# Patient Record
Sex: MALE | Race: WHITE | Employment: OTHER | URBAN - METROPOLITAN AREA
[De-identification: names, ages, dates, MRNs, and addresses within clinical notes are randomized per-mention and may not be internally consistent; named-entity substitution may affect disease eponyms.]

---

## 2017-05-01 PROCEDURE — 87086 URINE CULTURE/COLONY COUNT: CPT | Performed by: EMERGENCY MEDICINE

## 2017-08-24 PROCEDURE — 36415 COLL VENOUS BLD VENIPUNCTURE: CPT | Performed by: INTERNAL MEDICINE

## 2017-08-24 PROCEDURE — 84154 ASSAY OF PSA FREE: CPT | Performed by: UROLOGY

## 2017-08-24 PROCEDURE — 82652 VIT D 1 25-DIHYDROXY: CPT | Performed by: INTERNAL MEDICINE

## 2017-08-24 PROCEDURE — 84153 ASSAY OF PSA TOTAL: CPT | Performed by: UROLOGY

## 2017-10-02 PROBLEM — Z79.01 ANTICOAGULANT LONG-TERM USE: Status: ACTIVE | Noted: 2017-10-02

## 2017-12-18 PROCEDURE — 87086 URINE CULTURE/COLONY COUNT: CPT | Performed by: EMERGENCY MEDICINE

## 2017-12-20 ENCOUNTER — HOSPITAL ENCOUNTER (INPATIENT)
Facility: HOSPITAL | Age: 82
LOS: 2 days | Discharge: HOME | DRG: 390 | End: 2017-12-22
Attending: HOSPITALIST | Admitting: HOSPITALIST
Payer: MEDICARE

## 2017-12-20 DIAGNOSIS — I48.20 CHRONIC ATRIAL FIBRILLATION (HCC): Primary | ICD-10-CM

## 2017-12-20 PROBLEM — K56.609 SMALL BOWEL OBSTRUCTION (HCC): Status: ACTIVE | Noted: 2017-12-20

## 2017-12-20 PROCEDURE — 87086 URINE CULTURE/COLONY COUNT: CPT | Performed by: EMERGENCY MEDICINE

## 2017-12-20 RX ORDER — DEXTROSE, SODIUM CHLORIDE, AND POTASSIUM CHLORIDE 5; .45; .15 G/100ML; G/100ML; G/100ML
INJECTION INTRAVENOUS CONTINUOUS
Status: DISCONTINUED | OUTPATIENT
Start: 2017-12-21 | End: 2017-12-22

## 2017-12-20 RX ORDER — MORPHINE SULFATE 2 MG/ML
1 INJECTION, SOLUTION INTRAMUSCULAR; INTRAVENOUS
Status: DISCONTINUED | OUTPATIENT
Start: 2017-12-20 | End: 2017-12-22

## 2017-12-20 RX ORDER — ONDANSETRON 2 MG/ML
8 INJECTION INTRAMUSCULAR; INTRAVENOUS EVERY 6 HOURS PRN
Status: DISCONTINUED | OUTPATIENT
Start: 2017-12-20 | End: 2017-12-22

## 2017-12-21 ENCOUNTER — APPOINTMENT (OUTPATIENT)
Dept: GENERAL RADIOLOGY | Facility: HOSPITAL | Age: 82
DRG: 390 | End: 2017-12-21
Attending: SURGERY
Payer: MEDICARE

## 2017-12-21 PROCEDURE — 85610 PROTHROMBIN TIME: CPT | Performed by: SURGERY

## 2017-12-21 PROCEDURE — 85730 THROMBOPLASTIN TIME PARTIAL: CPT | Performed by: SURGERY

## 2017-12-21 PROCEDURE — 85025 COMPLETE CBC W/AUTO DIFF WBC: CPT | Performed by: SURGERY

## 2017-12-21 PROCEDURE — 83735 ASSAY OF MAGNESIUM: CPT | Performed by: SURGERY

## 2017-12-21 PROCEDURE — 80048 BASIC METABOLIC PNL TOTAL CA: CPT | Performed by: SURGERY

## 2017-12-21 PROCEDURE — 84100 ASSAY OF PHOSPHORUS: CPT | Performed by: SURGERY

## 2017-12-21 PROCEDURE — 74020 XR ABDOMEN, OBSTRUCTIVE SERIES (CPT=74020): CPT | Performed by: SURGERY

## 2017-12-21 RX ORDER — LEVOTHYROXINE SODIUM 0.07 MG/1
75 TABLET ORAL
Status: DISCONTINUED | OUTPATIENT
Start: 2017-12-22 | End: 2017-12-22

## 2017-12-21 RX ORDER — PANTOPRAZOLE SODIUM 40 MG/1
40 TABLET, DELAYED RELEASE ORAL
Status: DISCONTINUED | OUTPATIENT
Start: 2017-12-22 | End: 2017-12-22

## 2017-12-21 RX ORDER — 0.9 % SODIUM CHLORIDE 0.9 %
VIAL (ML) INJECTION
Status: COMPLETED
Start: 2017-12-21 | End: 2017-12-21

## 2017-12-21 RX ORDER — DONEPEZIL HYDROCHLORIDE 10 MG/1
10 TABLET, FILM COATED ORAL NIGHTLY
Status: DISCONTINUED | OUTPATIENT
Start: 2017-12-21 | End: 2017-12-22

## 2017-12-21 RX ORDER — ALFUZOSIN HYDROCHLORIDE 10 MG/1
10 TABLET, EXTENDED RELEASE ORAL
Status: DISCONTINUED | OUTPATIENT
Start: 2017-12-22 | End: 2017-12-22

## 2017-12-21 RX ORDER — MEMANTINE HYDROCHLORIDE 5 MG/1
10 TABLET ORAL 2 TIMES DAILY
Status: DISCONTINUED | OUTPATIENT
Start: 2017-12-21 | End: 2017-12-22

## 2017-12-21 NOTE — CM/SW NOTE
Explanation of of BPCI/Medicare program provided. Patient was enrolled under . Patient/family agreed to phone f/u for 3 months from [facility] after discharge from [address] [place] [address]. BPCI/Medicare letter and brochure provided.     [place], [address] [place] [address]

## 2017-12-21 NOTE — PLAN OF CARE
Altered Communication/Language Barrier    • Patient/Family is able to understand and participate in their care Progressing        GASTROINTESTINAL - ADULT    • Minimal or absence of nausea and vomiting Progressing    • Maintains or returns to baseline lauren

## 2017-12-22 ENCOUNTER — APPOINTMENT (OUTPATIENT)
Dept: GENERAL RADIOLOGY | Facility: HOSPITAL | Age: 82
DRG: 390 | End: 2017-12-22
Attending: SURGERY
Payer: MEDICARE

## 2017-12-22 VITALS
SYSTOLIC BLOOD PRESSURE: 134 MMHG | TEMPERATURE: 99 F | RESPIRATION RATE: 20 BRPM | DIASTOLIC BLOOD PRESSURE: 75 MMHG | HEART RATE: 62 BPM | OXYGEN SATURATION: 96 %

## 2017-12-22 PROCEDURE — 92610 EVALUATE SWALLOWING FUNCTION: CPT

## 2017-12-22 PROCEDURE — 84100 ASSAY OF PHOSPHORUS: CPT | Performed by: SURGERY

## 2017-12-22 PROCEDURE — 85610 PROTHROMBIN TIME: CPT | Performed by: SURGERY

## 2017-12-22 PROCEDURE — 74020 XR ABDOMEN, OBSTRUCTIVE SERIES (CPT=74020): CPT | Performed by: SURGERY

## 2017-12-22 PROCEDURE — 83735 ASSAY OF MAGNESIUM: CPT | Performed by: SURGERY

## 2017-12-22 PROCEDURE — 85025 COMPLETE CBC W/AUTO DIFF WBC: CPT | Performed by: SURGERY

## 2017-12-22 PROCEDURE — 85730 THROMBOPLASTIN TIME PARTIAL: CPT | Performed by: SURGERY

## 2017-12-22 PROCEDURE — 80048 BASIC METABOLIC PNL TOTAL CA: CPT | Performed by: SURGERY

## 2017-12-22 NOTE — PLAN OF CARE
Altered Communication/Language Barrier    • Patient/Family is able to understand and participate in their care Progressing    Patient speaks [place] and [place]. Speaks [place] when he is more confused per family.     GASTROINTESTINAL - ADULT    • Minimal or ab

## 2017-12-22 NOTE — SLP NOTE
ADULT SWALLOWING EVALUATION    ASSESSMENT    ASSESSMENT/OVERALL IMPRESSION:  SLP swallow orders received and acknowledged.  Pt upright 90 degrees in chair at bedside for swallow eval. Pt's daughter present at bedside and reports pt coughing post meals while Regular  Precautions: Aspiration    Patient/Family Goals: Use of Compensatory Strategies     SWALLOWING HISTORY  Current Diet Consistency:  (Clear Liquids)  Dysphagia History: N/A   Imaging Results: N/A     OBJECTIVE   ORAL MOTOR EXAMINATION  Dentition:  Fu Zeenat King    Therapist:  Zeenat King M.S. [address] [facility]  Speech Language Pathologist

## 2017-12-22 NOTE — H&P
[facility] Hospitalist H&P       CC: abdominal pain     PCP: Justice Luna MD    ASSESSMENT / PLAN:   Patient is a 80year old male with PMH sig for HTN, CAD, SSS s/p pacemaker, afib on coumadin, dementia, prostate ca s/p brachytherapy 2006, hypothyroidism, B hospital. Per son pt has had sbo in past but has had less issues recently since taking miralax daily.        PMH  Past Medical History:   Diagnosis Date   • Coronary atherosclerosis of unspecified type of vessel, native or graft 2001    has pacemaker   • Humberto Dozier capsule Rfl: 3   aspirin EC 81 MG Oral Tab EC Take 81 mg by mouth. Disp:  Rfl:    metoprolol Tartrate 25 MG Oral Tab Take 12.5 mg by mouth 2 (two) times daily.    Disp:  Rfl:    DONEPEZIL HCL 10 MG Oral Tab TAKE ONE TABLET BY MOUTH NIGHTLY Disp: 90 tablet R tenderness mid abdominal region, no gaurding. No masses,  No organomegaly. Non distended   Extremities: Extremities normal, atraumatic, no cyanosis or edema. Skin: Skin color and texture are normal.  No rashes or lesions.     Neurologic: Normal strength, vault.  3. Lesser incidental findings as above. Ct Abdomen+pelvis(contrast Only)(cpt=74177)    Result Date: 12/20/2017  IMPRESSION: 1.  Small bowel obstruction new from the previous exam with a transition point in the left lower quadrant, probably re

## 2017-12-22 NOTE — PROGRESS NOTES
[facility]    Progress Note    Shayy Flynn Patient Status:  Inpatient    8/10/1932 MRN [id number]   Location [facility] 4W/SW/SE Attending Osmar Xavier [facility] Day # 2 PCP Clemetine Hamman, MD       Subjective: 119*  129*  104*   BUN  17  13  7*   CREATSERUM  1.09  0.91  0.87   GFRAA   --   >60  >60   GFRNAA   --   >60  >60   CA   --   8.4*  8.3*   ALB  3.4*   --    --    NA  134*  136  140   K  3.9  3.9  3.8   CL  104  104  110   CO2  26.2  26  26   ALKPHO  57 care:    02350- 25 min      Dhru[address] 30  12/22/2017  8:47 AM

## 2017-12-23 NOTE — DISCHARGE SUMMARY
[facility] Hospitalist Discharge Summary   Patient ID:  Flora Gil  [id number]  9 year old  8/10/1932    Admit date: 12/20/2017  Discharge date: 12/22/2017  Primary Care Physician: Stephanie Bolaños MD   Attending Physician: No att. providers found   Co CTAB/L  CARDIO: irregular, no murmur  ABD: soft, NT, ND, +BS  : no juarez  EXTREMITIES: no edema, no calf tenderness    Operative Procedures:   Radiology:   Xr Abdomen, Obstructive Series (cpt=74020)    Result Date: 12/22/2017  CONCLUSION:  1. Stable exam units Caps  Commonly known as:  DRISDOL/VITAMIN D2  Take 1 capsule (50,000 Units total) by mouth once a week.      ICAPS AREDS FORMULA OR     Levothyroxine Sodium 75 MCG Tabs  Commonly known as:  SYNTHROID, LEVOTHROID  Take 1 tablet (75 mcg total) by mouth

## 2018-02-20 PROCEDURE — 84153 ASSAY OF PSA TOTAL: CPT | Performed by: UROLOGY

## 2018-02-20 PROCEDURE — 84154 ASSAY OF PSA FREE: CPT | Performed by: UROLOGY

## 2018-02-20 PROCEDURE — 86480 TB TEST CELL IMMUN MEASURE: CPT | Performed by: INTERNAL MEDICINE

## 2018-05-01 PROBLEM — Z51.81 MONITORING FOR LONG-TERM ANTICOAGULANT USE: Status: ACTIVE | Noted: 2018-05-01

## 2018-05-01 PROBLEM — Z79.01 MONITORING FOR LONG-TERM ANTICOAGULANT USE: Status: ACTIVE | Noted: 2018-05-01

## 2018-06-21 PROBLEM — Z79.01 MONITORING FOR LONG-TERM ANTICOAGULANT USE: Status: RESOLVED | Noted: 2018-05-01 | Resolved: 2018-06-21

## 2018-06-21 PROBLEM — Z79.01 ANTICOAGULANT LONG-TERM USE: Status: RESOLVED | Noted: 2017-10-02 | Resolved: 2018-06-21

## 2018-06-21 PROBLEM — Z51.81 MONITORING FOR LONG-TERM ANTICOAGULANT USE: Status: RESOLVED | Noted: 2018-05-01 | Resolved: 2018-06-21

## 2018-06-21 PROCEDURE — 84154 ASSAY OF PSA FREE: CPT | Performed by: UROLOGY

## 2018-06-21 PROCEDURE — 84153 ASSAY OF PSA TOTAL: CPT | Performed by: UROLOGY

## 2018-08-20 PROCEDURE — 82607 VITAMIN B-12: CPT | Performed by: OTHER

## 2018-08-20 PROCEDURE — 82746 ASSAY OF FOLIC ACID SERUM: CPT | Performed by: OTHER

## 2018-11-11 ENCOUNTER — APPOINTMENT (OUTPATIENT)
Dept: CT IMAGING | Facility: HOSPITAL | Age: 83
DRG: 390 | End: 2018-11-11
Attending: EMERGENCY MEDICINE
Payer: MEDICARE

## 2018-11-11 ENCOUNTER — HOSPITAL ENCOUNTER (INPATIENT)
Facility: HOSPITAL | Age: 83
LOS: 3 days | Discharge: NURSING, REHAB OR LONG-TERM CARE | DRG: 390 | End: 2018-11-14
Attending: EMERGENCY MEDICINE | Admitting: HOSPITALIST
Payer: MEDICARE

## 2018-11-11 ENCOUNTER — APPOINTMENT (OUTPATIENT)
Dept: GENERAL RADIOLOGY | Facility: HOSPITAL | Age: 83
DRG: 390 | End: 2018-11-11
Attending: EMERGENCY MEDICINE
Payer: MEDICARE

## 2018-11-11 DIAGNOSIS — K56.609 SMALL BOWEL OBSTRUCTION (HCC): Primary | ICD-10-CM

## 2018-11-11 PROBLEM — R73.9 HYPERGLYCEMIA: Status: ACTIVE | Noted: 2018-11-11

## 2018-11-11 PROBLEM — R79.89 AZOTEMIA: Status: ACTIVE | Noted: 2018-11-11

## 2018-11-11 PROCEDURE — 85610 PROTHROMBIN TIME: CPT | Performed by: EMERGENCY MEDICINE

## 2018-11-11 PROCEDURE — 81001 URINALYSIS AUTO W/SCOPE: CPT | Performed by: EMERGENCY MEDICINE

## 2018-11-11 PROCEDURE — 70450 CT HEAD/BRAIN W/O DYE: CPT | Performed by: EMERGENCY MEDICINE

## 2018-11-11 PROCEDURE — 99285 EMERGENCY DEPT VISIT HI MDM: CPT

## 2018-11-11 PROCEDURE — 85025 COMPLETE CBC W/AUTO DIFF WBC: CPT | Performed by: EMERGENCY MEDICINE

## 2018-11-11 PROCEDURE — 85730 THROMBOPLASTIN TIME PARTIAL: CPT | Performed by: EMERGENCY MEDICINE

## 2018-11-11 PROCEDURE — 96361 HYDRATE IV INFUSION ADD-ON: CPT

## 2018-11-11 PROCEDURE — 80048 BASIC METABOLIC PNL TOTAL CA: CPT | Performed by: EMERGENCY MEDICINE

## 2018-11-11 PROCEDURE — 74019 RADEX ABDOMEN 2 VIEWS: CPT | Performed by: EMERGENCY MEDICINE

## 2018-11-11 PROCEDURE — 74177 CT ABD & PELVIS W/CONTRAST: CPT | Performed by: EMERGENCY MEDICINE

## 2018-11-11 PROCEDURE — 96360 HYDRATION IV INFUSION INIT: CPT

## 2018-11-11 RX ORDER — DEXTROSE, SODIUM CHLORIDE, AND POTASSIUM CHLORIDE 5; .45; .15 G/100ML; G/100ML; G/100ML
INJECTION INTRAVENOUS CONTINUOUS
Status: DISCONTINUED | OUTPATIENT
Start: 2018-11-11 | End: 2018-11-12

## 2018-11-11 RX ORDER — ONDANSETRON 2 MG/ML
4 INJECTION INTRAMUSCULAR; INTRAVENOUS EVERY 6 HOURS PRN
Status: DISCONTINUED | OUTPATIENT
Start: 2018-11-11 | End: 2018-11-11

## 2018-11-11 RX ORDER — ACETAMINOPHEN 325 MG/1
650 TABLET ORAL EVERY 6 HOURS PRN
Status: DISCONTINUED | OUTPATIENT
Start: 2018-11-11 | End: 2018-11-14

## 2018-11-11 RX ORDER — MORPHINE SULFATE 2 MG/ML
2 INJECTION, SOLUTION INTRAMUSCULAR; INTRAVENOUS EVERY 2 HOUR PRN
Status: DISCONTINUED | OUTPATIENT
Start: 2018-11-11 | End: 2018-11-14

## 2018-11-11 RX ORDER — MORPHINE SULFATE 4 MG/ML
4 INJECTION, SOLUTION INTRAMUSCULAR; INTRAVENOUS EVERY 2 HOUR PRN
Status: DISCONTINUED | OUTPATIENT
Start: 2018-11-11 | End: 2018-11-14

## 2018-11-11 RX ORDER — METOCLOPRAMIDE HYDROCHLORIDE 5 MG/ML
10 INJECTION INTRAMUSCULAR; INTRAVENOUS EVERY 8 HOURS PRN
Status: DISCONTINUED | OUTPATIENT
Start: 2018-11-11 | End: 2018-11-14

## 2018-11-11 RX ORDER — SODIUM CHLORIDE 0.9 % (FLUSH) 0.9 %
3 SYRINGE (ML) INJECTION AS NEEDED
Status: DISCONTINUED | OUTPATIENT
Start: 2018-11-11 | End: 2018-11-14

## 2018-11-11 RX ORDER — ENOXAPARIN SODIUM 100 MG/ML
40 INJECTION SUBCUTANEOUS NIGHTLY
Status: DISCONTINUED | OUTPATIENT
Start: 2018-11-11 | End: 2018-11-12

## 2018-11-11 RX ORDER — SODIUM CHLORIDE 9 MG/ML
INJECTION, SOLUTION INTRAVENOUS CONTINUOUS
Status: DISCONTINUED | OUTPATIENT
Start: 2018-11-11 | End: 2018-11-11

## 2018-11-11 RX ORDER — ONDANSETRON 2 MG/ML
4 INJECTION INTRAMUSCULAR; INTRAVENOUS EVERY 6 HOURS PRN
Status: DISCONTINUED | OUTPATIENT
Start: 2018-11-11 | End: 2018-11-14

## 2018-11-11 RX ORDER — MORPHINE SULFATE 2 MG/ML
1 INJECTION, SOLUTION INTRAMUSCULAR; INTRAVENOUS EVERY 2 HOUR PRN
Status: DISCONTINUED | OUTPATIENT
Start: 2018-11-11 | End: 2018-11-14

## 2018-11-11 RX ORDER — MORPHINE SULFATE 2 MG/ML
3 INJECTION, SOLUTION INTRAMUSCULAR; INTRAVENOUS EVERY 2 HOUR PRN
Status: DISCONTINUED | OUTPATIENT
Start: 2018-11-11 | End: 2018-11-14

## 2018-11-11 RX ORDER — SODIUM CHLORIDE 9 MG/ML
INJECTION, SOLUTION INTRAVENOUS ONCE
Status: COMPLETED | OUTPATIENT
Start: 2018-11-11 | End: 2018-11-11

## 2018-11-12 ENCOUNTER — APPOINTMENT (OUTPATIENT)
Dept: GENERAL RADIOLOGY | Facility: HOSPITAL | Age: 83
DRG: 390 | End: 2018-11-12
Attending: SURGERY
Payer: MEDICARE

## 2018-11-12 PROCEDURE — 84100 ASSAY OF PHOSPHORUS: CPT | Performed by: SURGERY

## 2018-11-12 PROCEDURE — 92610 EVALUATE SWALLOWING FUNCTION: CPT

## 2018-11-12 PROCEDURE — 83735 ASSAY OF MAGNESIUM: CPT | Performed by: SURGERY

## 2018-11-12 PROCEDURE — 80048 BASIC METABOLIC PNL TOTAL CA: CPT | Performed by: SURGERY

## 2018-11-12 PROCEDURE — 84443 ASSAY THYROID STIM HORMONE: CPT | Performed by: INTERNAL MEDICINE

## 2018-11-12 PROCEDURE — 85025 COMPLETE CBC W/AUTO DIFF WBC: CPT | Performed by: SURGERY

## 2018-11-12 PROCEDURE — 74250 X-RAY XM SM INT 1CNTRST STD: CPT | Performed by: SURGERY

## 2018-11-12 RX ORDER — CITALOPRAM 20 MG/1
10 TABLET ORAL NIGHTLY
Status: DISCONTINUED | OUTPATIENT
Start: 2018-11-12 | End: 2018-11-14

## 2018-11-12 RX ORDER — ALFUZOSIN HYDROCHLORIDE 10 MG/1
10 TABLET, EXTENDED RELEASE ORAL
Status: DISCONTINUED | OUTPATIENT
Start: 2018-11-13 | End: 2018-11-14

## 2018-11-12 RX ORDER — WARFARIN SODIUM 2 MG/1
2 TABLET ORAL
Status: DISCONTINUED | OUTPATIENT
Start: 2018-11-16 | End: 2018-11-14

## 2018-11-12 RX ORDER — LEVOTHYROXINE SODIUM 0.07 MG/1
75 TABLET ORAL
Status: DISCONTINUED | OUTPATIENT
Start: 2018-11-12 | End: 2018-11-14

## 2018-11-12 RX ORDER — ASPIRIN 81 MG/1
81 TABLET ORAL DAILY
Status: DISCONTINUED | OUTPATIENT
Start: 2018-11-12 | End: 2018-11-14

## 2018-11-12 RX ORDER — MAGNESIUM SULFATE HEPTAHYDRATE 40 MG/ML
2 INJECTION, SOLUTION INTRAVENOUS ONCE
Status: COMPLETED | OUTPATIENT
Start: 2018-11-12 | End: 2018-11-12

## 2018-11-12 RX ORDER — MEMANTINE HYDROCHLORIDE 10 MG/1
10 TABLET ORAL 2 TIMES DAILY
Status: DISCONTINUED | OUTPATIENT
Start: 2018-11-12 | End: 2018-11-14

## 2018-11-12 RX ORDER — WARFARIN SODIUM 4 MG/1
4 TABLET ORAL
Status: DISCONTINUED | OUTPATIENT
Start: 2018-11-12 | End: 2018-11-14

## 2018-11-12 RX ORDER — WARFARIN SODIUM 4 MG/1
4 TABLET ORAL SEE ADMIN INSTRUCTIONS
COMMUNITY
End: 2018-12-13

## 2018-11-12 RX ORDER — SODIUM CHLORIDE 9 MG/ML
INJECTION, SOLUTION INTRAVENOUS CONTINUOUS
Status: DISCONTINUED | OUTPATIENT
Start: 2018-11-12 | End: 2018-11-12

## 2018-11-12 RX ORDER — DONEPEZIL HYDROCHLORIDE 10 MG/1
10 TABLET, FILM COATED ORAL NIGHTLY
Status: DISCONTINUED | OUTPATIENT
Start: 2018-11-12 | End: 2018-11-14

## 2018-11-12 RX ORDER — SODIUM CHLORIDE 9 MG/ML
INJECTION, SOLUTION INTRAVENOUS
Status: COMPLETED
Start: 2018-11-12 | End: 2018-11-12

## 2018-11-12 NOTE — ED PROVIDER NOTES
Patient Seen in: [facility] 4w/sw/se    History   Patient presents with:  Abdomen/Flank Pain (GI/)    Stated Complaint: abdominal pain, hx of bowel obstruction    HPI    Patient presents the emergency department with family states that he is muc Smokeless tobacco: Never Used    Alcohol use: Yes      Comment: occasional glass of wine    Drug use: No      Review of Systems    Positive for stated complaint: abdominal pain, hx of bowel obstruction  Other systems are as noted in HPI.   Constitutional an - Abnormal; Notable for the following components:    PT 25.6 (*)     INR 2.4 (*)     All other components within normal limits   CBC W/ DIFFERENTIAL - Abnormal; Notable for the following components:    RBC 4.45 (*)     RDW 16.2 (*)     Neutrophil Absolute cystic lesions, including a dominant left upper pole cyst measuring up to 8.7 cm. There is a complex, partially calcified left renal mass, not significantly changed since prior study from 12/2017.   Dictated by (CST): Chrissy Chaudhry MD on 11/11/2018 at 2

## 2018-11-12 NOTE — H&P
[facility] Hospitalist H&P     CC: Patient presents with:  Abdomen/Flank Pain (GI/)       PCP: Benny Luu MD    Admission Date: 11/11/2018    ASSESSMENT / PLAN:   Mr. Maria De Jesus Stanford is a 80year old male with PMH of HTN, prostate CA, colon CA, advanced de SBO vs ileus. Has had prior abdominal surgery for colon resection and 3 prior admissions for SBO per wife.        PMH  Past Medical History:   Diagnosis Date   • Atrial fibrillation ([facility] [address] 11/11/2018    Takes Coumadin   • [facility] except for what's stated above.      OBJECTIVE:  /54 (BP Location: Right arm)   Pulse 65   Temp 98.1 °F (36.7 °C) (Oral)   Resp 16   Ht 167.6 cm (5' 6\")   Wt 151 lb 11.2 oz (68.8 kg)   SpO2 91%   BMI 24.49 kg/m²     GEN: NAD, Urdu speaking  HEENT: E Date: 11/11/2018  CONCLUSION:   Multiple gas and fluid distended loops of small bowel are present predominantly in the left abdomen. A focal transition point is not seen with certainty.  There is moderate stool present throughout the colon, particularly at

## 2018-11-12 NOTE — CONSULTS
[facility]    Report of Consultation    Lovely Hillary Patient Status:  Inpatient    8/10/1932 MRN [id number]   Location [facility] 4W/SW/SE Attending Kat Tovar MD   Hosp Day # 1 PCP Meghan Miranda MD     Date of Admi pacemaker   • OTHER SURGICAL HISTORY      removal of scalp lesion   • REMOVAL OF TONSILS,12+ Y/O  1955   • SKIN SURGERY  09/06/2018    [facility] SCC Right Occiput Dr Blanca Rocha REPORT  June 2006    Brachytherapy of the prostate, seeds-pro stated age, cooperative, no distress and non phonating  Eyes: Sclera anicteric  Neck: no JVD and supple, symmetrical, trachea midline  Pulmonary:  No labored breathing, equal respiratory excursion  Cardiovascular: regular rate and rhythm  Abdominal: soft, seen with certainty. There is moderate stool present throughout the colon, particularly at the level of the sigmoid anastomotic suture, and in the rectum.  Findings may represent a combination of constipation/delayed enteric transit and small bowel ileus ve discussion with the patient's son who is a psychiatrist who is at the bedside.   Recommend nonoperative management with IV fluids, n.p.o., IV hydration bowel rest.  Patient will Gastrografin small bowel follow-through this morning to evaluate for partial ve

## 2018-11-12 NOTE — ED NOTES
Report given to [facility], awaiting for transport

## 2018-11-12 NOTE — CM/SW NOTE
CLARE met with the pt. And wife At bedside and spoke with the pt's dtr. Laurie via telephone. The pt. Lives with his wife and dtr. In a split level home with 1 step to enter and 5 stairs between levels. The pt.  Has been ambulating on his own, but his wif

## 2018-11-12 NOTE — SLP NOTE
ADULT SWALLOWING EVALUATION    ASSESSMENT    ASSESSMENT/OVERALL IMPRESSION:  Pt seen sitting upright in chair for all PO trials for BSSE.  Orders rec'd following family report of patient with wet/gurgly vocal quality during the \"night\" and \"while sleepin Thin  Medication Administration Recommendations: Crushed in puree  Treatment Plan/Recommendations: Videofluoroscopic swallow study  Discharge Recommendations/Plan: Undetermined    HISTORY   MEDICAL HISTORY  Reason for Referral: R/O aspiration    Problem Sushant Kolb note: Silent aspiration cannot be evaluated clinically.  Videofluoroscopic Swallow Study is required to rule-out silent aspiration.)    Esophageal Phase of Swallow: No complaints consistent with possible esophageal involvement    GOALS  Goal #1 The patient

## 2018-11-12 NOTE — ED NOTES
Care assumed from triage. Pt presents with family, reporting abd pain since 36 today. HX dementia, PD, unable to verbalize complaints at this time.  Family also reports HX of bowel obstructions, daughter states \"this is how he acts when he has an obstruc

## 2018-11-12 NOTE — ED INITIAL ASSESSMENT (HPI)
Family reports that patient gets frequent bowel obstructions and is behaving today like he has when he's had them in the past.  Patient has dementia and is unable to verbalize complaints.

## 2018-11-13 ENCOUNTER — APPOINTMENT (OUTPATIENT)
Dept: GENERAL RADIOLOGY | Facility: HOSPITAL | Age: 83
DRG: 390 | End: 2018-11-13
Attending: INTERNAL MEDICINE
Payer: MEDICARE

## 2018-11-13 ENCOUNTER — APPOINTMENT (OUTPATIENT)
Dept: GENERAL RADIOLOGY | Facility: HOSPITAL | Age: 83
DRG: 390 | End: 2018-11-13
Attending: SURGERY
Payer: MEDICARE

## 2018-11-13 PROCEDURE — 74021 RADEX ABDOMEN 3+ VIEWS: CPT | Performed by: SURGERY

## 2018-11-13 PROCEDURE — 97530 THERAPEUTIC ACTIVITIES: CPT

## 2018-11-13 PROCEDURE — 97166 OT EVAL MOD COMPLEX 45 MIN: CPT

## 2018-11-13 PROCEDURE — 80048 BASIC METABOLIC PNL TOTAL CA: CPT | Performed by: SURGERY

## 2018-11-13 PROCEDURE — 83735 ASSAY OF MAGNESIUM: CPT | Performed by: INTERNAL MEDICINE

## 2018-11-13 PROCEDURE — 85610 PROTHROMBIN TIME: CPT | Performed by: INTERNAL MEDICINE

## 2018-11-13 PROCEDURE — 84132 ASSAY OF SERUM POTASSIUM: CPT | Performed by: INTERNAL MEDICINE

## 2018-11-13 PROCEDURE — 92611 MOTION FLUOROSCOPY/SWALLOW: CPT

## 2018-11-13 PROCEDURE — 97116 GAIT TRAINING THERAPY: CPT

## 2018-11-13 PROCEDURE — 80076 HEPATIC FUNCTION PANEL: CPT | Performed by: SURGERY

## 2018-11-13 PROCEDURE — 97162 PT EVAL MOD COMPLEX 30 MIN: CPT

## 2018-11-13 PROCEDURE — 74230 X-RAY XM SWLNG FUNCJ C+: CPT | Performed by: INTERNAL MEDICINE

## 2018-11-13 PROCEDURE — 85025 COMPLETE CBC W/AUTO DIFF WBC: CPT | Performed by: SURGERY

## 2018-11-13 RX ORDER — MAGNESIUM OXIDE 400 MG (241.3 MG MAGNESIUM) TABLET
400 TABLET ONCE
Status: COMPLETED | OUTPATIENT
Start: 2018-11-13 | End: 2018-11-13

## 2018-11-13 RX ORDER — ARIPIPRAZOLE 15 MG/1
40 TABLET ORAL ONCE
Status: COMPLETED | OUTPATIENT
Start: 2018-11-13 | End: 2018-11-13

## 2018-11-13 NOTE — PROGRESS NOTES
[facility]    Progress Note    Chad Andre Patient Status:  Inpatient    8/10/1932 MRN [id number]   Location [facility] 4W/SW/SE Attending Noah Crigler, MD   Hosp Day # 2 PCP Kimo López MD     Subjective:     Pt seen decision-making  And coordinating the patient's care including greater than 50% face-to-face was  25  minutes.     [address]     11/13/2018  10:40 AM        Meds:     • potassium chloride  40 mEq O Dictated by (CST): Michelet Sandy MD on 11/12/2018 at 10:07     Approved by (CST): Michelet Sandy MD on 11/12/2018 at 10:15          Ct Brain Or Head (75946)    Result Date: 11/11/2018  CONCLUSION:   No acute intracranial abnormality by noncontrast CT.   Ge

## 2018-11-13 NOTE — PHYSICAL THERAPY NOTE
Chart reviewed, attempted to see pt at 11 am-pt off the floor at x-ray. To re-attempt this PM as appropriate, schedule permitting.

## 2018-11-13 NOTE — SLP NOTE
ADULT VIDEOFLUOROSCOPIC SWALLOWING STUDY    Admission Date: 11/11/2018  Evaluation Date: 11/13/18  Radiologist: Wander    RECOMMENDATIONS   Diet Recommendations - Solids: Soft diet(GI soft per MD)  Diet Recommendations - Liquid:  Thin    Further Follow function. THIN LIQUIDS  Method of Presentation: Cup;Straw  Oral Phase of Swallow (VFSS - Thin Liquids):  Within Functional Limits  Triggered at: Pyriform sinuses  Premature Spillage to: Pyriform sinuses  Delay (seconds): (2-3)  Residue Severity, Location Abnormal     GOALS  Goal #1 The patient will tolerate soft diet consistency and thin  liquids without overt signs or symptoms of aspiration with 100 % accuracy over 2 session(s).   In Progress   Goal #2 The patient/family/caregiver will demonstrate understa

## 2018-11-13 NOTE — HOME CARE LIAISON
Met with patient and wife at the bedside. Patient is agreeable to [facility]. Brochure and liaison's card provided with contact information. All questions addressed and answered.

## 2018-11-13 NOTE — CM/SW NOTE
CLARE spoke with the pt's dtr. Who is concerned about the pt. Returning home as he hasn't walked today. The pt's dtr. Is inquiring about a hospital bed, WC and commode for home. CLARE inquired about rehab with the pt's dtr. As she is so concerned about the pt.

## 2018-11-13 NOTE — PROGRESS NOTES
[facility] Hospitalist Progress Note     Reason for Admission: Abdominal Pain  PCP: Kimo López MD     Assessment/Plan:     Principal Problem:    Small bowel obstruction ([address]  Active Problems:    Azotemia    Hyperglycemia    Mr. Cartwright is a 80 year ol 24 hours ending 11/13/18 1642    Last 3 Weights  11/11/18 2230 : 151 lb 11.2 oz (68.8 kg)  11/11/18 1854 : 149 lb (67.6 kg)  09/20/18 0940 : 154 lb 3.2 oz (69.9 kg)  07/23/18 1058 : 154 lb (69.9 kg)      Exam   GEN: NAD, [place] speaking  HEENT: ROGELIO FLORES Generalized atrophy with nonspecific white matter changes most likely related to chronic microvascular ischemia.   Dictated by (CST): Yoel Kwong MD on 11/11/2018 at 20:55     Approved by (CST): Yoel Kwong MD on 11/11/2018 at 20:59          Xr Hydrobromide  10 mg Oral Nightly   • Donepezil HCl  10 mg Oral Nightly   • Levothyroxine Sodium  75 mcg Oral Daily   • Memantine HCl  10 mg Oral BID   • Alfuzosin HCl ER  10 mg Oral Daily with breakfast   • Warfarin Sodium  4 mg Oral Once per day on Sun Mo

## 2018-11-13 NOTE — PHYSICAL THERAPY NOTE
PHYSICAL THERAPY EVALUATION - INPATIENT     Room Number: 457/457-A  Evaluation Date: 11/13/2018  Type of Evaluation: Initial   Physician Order: PT Eval and Treat    Presenting Problem: p/w abdominal pain, r/o SMO, dx constipation, delayed enteric transit cues for hand placement on walker once in standing. PT/OT assisted with donning new depends and clean-up at MAX A, pt able to assist mildly by pulling up depends with MIN-MOD A for standing balance.  Pt ambulated to bedside chair at MOD A for walker managem PHYSICAL THERAPY MEDICAL/SOCIAL HISTORY     Problem List  Principal Problem:    Small bowel obstruction (HCC)  Active Problems:    Azotemia    Hyperglycemia    Past Medical History  Past Medical History:   Diagnosis Date   • Atrial fibrillation ([facility] [address] 1 admission but owns rolling walker and a shower chair. SUBJECTIVE  \"Let me do it. \"    PHYSICAL THERAPY EXAMINATION     OBJECTIVE  Precautions: Bed/chair alarm(dementia)  Fall Risk: High fall risk    WEIGHT BEARING RESTRICTION  Weight Bearing Restricti need...   -   Moving to and from a bed to a chair (including a wheelchair)?: A Lot   -   Need to walk in hospital room?: A Lot   -   Climbing 3-5 steps with a railing?: Total     AM-PAC Score:  Raw Score: 12   PT Approx Degree of Impairment Score: 68.66% will negotiate one curb w/ assistive device and MOD A    Goal #4   Current Status    Goal #5 Patient to demonstrate independence with home activity/exercise instructions provided to patient in preparation for discharge.    Goal #5   Current Status    Goal #

## 2018-11-13 NOTE — OCCUPATIONAL THERAPY NOTE
OCCUPATIONAL THERAPY EVALUATION - INPATIENT     Room Number: 457/457-A  Evaluation Date: 11/13/2018  Type of Evaluation: Initial  Presenting Problem: sbo    Physician Order: IP Consult to Occupational Therapy  Reason for Therapy: ADL/IADL Dysfunction and D to share information. Daughter contacted and arrangements made for family training w/ OT/PT at 9:30 on Wednesday 11/14/18 to practice transfers and address home concerns. RN aware of pt's status, performance in therapy and discharge recommendations.  RN req ENDOSCOPY,DIAGNOSIS  12/9/05    wnl     HOME SITUATION  Type of Home: House  Home Layout: Multi-level  Lives With: Spouse;Daughter     Toilet and Equipment: Standard height toilet  Shower/Tub and Equipment: Walk-in shower; Shower chair  Other Equipment: Non ASSESSMENT  Static Sitting: supervision  Dynamic Sitting: cga  Static Standing: rw and min a x 1  Dynamic Standing: mod a x 2 w/ rw    FUNCTIONAL ADL ASSESSMENT  Grooming: mod/max a  Feeding: mod/max a  Bathing: max a  Toileting: max a  Upper Extremity Dominic

## 2018-11-13 NOTE — PLAN OF CARE
GASTROINTESTINAL - ADULT    • Minimal or absence of nausea and vomiting Adequate for Discharge    • Maintains or returns to baseline bowel function Adequate for Discharge        Patient Centered Care    • Patient preferences are identified and integrated i

## 2018-11-14 VITALS
BODY MASS INDEX: 24.38 KG/M2 | DIASTOLIC BLOOD PRESSURE: 77 MMHG | OXYGEN SATURATION: 94 % | HEART RATE: 72 BPM | RESPIRATION RATE: 18 BRPM | TEMPERATURE: 99 F | SYSTOLIC BLOOD PRESSURE: 99 MMHG | HEIGHT: 66 IN | WEIGHT: 151.69 LBS

## 2018-11-14 PROCEDURE — 85610 PROTHROMBIN TIME: CPT | Performed by: INTERNAL MEDICINE

## 2018-11-14 PROCEDURE — 97116 GAIT TRAINING THERAPY: CPT

## 2018-11-14 PROCEDURE — A4216 STERILE WATER/SALINE, 10 ML: HCPCS | Performed by: HOSPITALIST

## 2018-11-14 PROCEDURE — 84132 ASSAY OF SERUM POTASSIUM: CPT | Performed by: INTERNAL MEDICINE

## 2018-11-14 PROCEDURE — 97530 THERAPEUTIC ACTIVITIES: CPT

## 2018-11-14 PROCEDURE — 80048 BASIC METABOLIC PNL TOTAL CA: CPT | Performed by: SURGERY

## 2018-11-14 PROCEDURE — 85025 COMPLETE CBC W/AUTO DIFF WBC: CPT | Performed by: SURGERY

## 2018-11-14 PROCEDURE — 97535 SELF CARE MNGMENT TRAINING: CPT

## 2018-11-14 PROCEDURE — 83735 ASSAY OF MAGNESIUM: CPT | Performed by: INTERNAL MEDICINE

## 2018-11-14 RX ORDER — SENNA AND DOCUSATE SODIUM 50; 8.6 MG/1; MG/1
1 TABLET, FILM COATED ORAL DAILY
Qty: 30 TABLET | Refills: 0 | Status: ON HOLD | COMMUNITY
Start: 2018-11-14 | End: 2019-04-21

## 2018-11-14 RX ORDER — MAGNESIUM SULFATE HEPTAHYDRATE 40 MG/ML
2 INJECTION, SOLUTION INTRAVENOUS ONCE
Status: COMPLETED | OUTPATIENT
Start: 2018-11-14 | End: 2018-11-14

## 2018-11-14 NOTE — DISCHARGE SUMMARY
[facility] Hospitalist Discharge Summary   Patient ID:  Gissell Tobias  [id number]  77 year old  8/10/1932    Admit date: 11/11/2018  Discharge date: 11/14/2018  Primary Care Physician: Bebe Galloway MD   Attending Physician: No att. providers found   Co citalopram   -social work consulted, may benefit from home palliative, increased services, however plan SNF for trial rehab for now.  May need increased services when does go home     #A Fib on Warfarin:  -daily INR  -continue warfarin 4 mg daily     #Hypot No Oral (er)    Result Date: 11/11/2018  CONCLUSION:   Multiple gas and fluid distended loops of small bowel are present predominantly in the left abdomen. A focal transition point is not seen with certainty.  There is moderate stool present throughout the as: NAMENDA  TAKE ONE TABLET BY MOUTH TWICE DAILY     MIRALAX OR     Omeprazole 40 MG Cpdr  Take 1 capsule (40 mg total) by mouth once daily.      ONE-A-DAY MENS Tabs     Senna-Docusate Sodium 8.6-50 MG Tabs  Commonly known as:  SENOKOT S     tamsulosin HC

## 2018-11-14 NOTE — PROGRESS NOTES
[facility]    Progress Note    Nori Art Patient Status:  Inpatient    8/10/1932 MRN [id number]   Location [facility] 4W/SW/SE Attending Zabrina Foley MD   Hosp Day # 3 PCP Sheri Ratliff MD       Subjective:   Gael Oneal ALT   --   18   --    BILT   --   1.0   --    TP   --   5.7*   --              Xr Abdomen, Obstructive Series 3 Views(cpt=74021)    Result Date: 11/13/2018  CONCLUSION:  1. No evidence of bowel obstruction. 2. Findings suggestive of mild ileus.      Dict

## 2018-11-14 NOTE — CM/SW NOTE
PT and OT are recommending rehab for the pt. SW met with the pt, his wife and dtr. Are agreeable to having the pt. Go to rehab at [facility]. [facility] has a private room on their memory care unit for the pt. The pt.  Is scheduled to discharge

## 2018-11-14 NOTE — OCCUPATIONAL THERAPY NOTE
OCCUPATIONAL THERAPY TREATMENT NOTE - INPATIENT        Room Number: 457/457-A           Presenting Problem: sbo    Problem List  Principal Problem:    Small bowel obstruction ([address]  Active Problems:    Azotemia    Hyperglycemia      ASSESSMENT   RN cleared complete mobility and transfers. Family agreeable --stating it would give time to for pt to return to [facility] and for the family to set-up necessary supports at home. Family is interested in a CHUYITA with memory care to best meet pt's needs.      DISCHARGE RECOMM Goals:  Patients self stated goal is: Family's goal is for pt to return home      Pt will complete functional transfer with mod assist of family    Comment: pt requires Mod to MAx A x 2 with therapy staff for safety           Pt will maintain static  stand

## 2018-11-14 NOTE — PHYSICAL THERAPY NOTE
PHYSICAL THERAPY TREATMENT NOTE - INPATIENT     Room Number: 457/457-A       Presenting Problem: p/w abdominal pain, r/o SMO, dx constipation, delayed enteric transit    Problem List  Principal Problem:    Small bowel obstruction ([address]  Active Problems: chair for application of dry brief requiring Max A x 2.    Although family eager to take patient back home in familiar environment due to dementia- family does not have home currently set up to modify for safety- 24/7 caregiver is not available and spouse i blankets)?: A Little   -   Sitting down on and standing up from a chair with arms (e.g., wheelchair, bedside commode, etc.): A Lot   -   Moving from lying on back to sitting on the side of the bed?: A Lot   How much help from another person does the patien #6 Caregivers demonstrate safe and proper handling of pt during all mobility to ensure pt and caregiver safety    Goal #6  Current Status  ongoing

## 2018-11-25 ENCOUNTER — APPOINTMENT (OUTPATIENT)
Dept: CT IMAGING | Facility: HOSPITAL | Age: 83
End: 2018-11-25
Attending: EMERGENCY MEDICINE
Payer: MEDICARE

## 2018-11-25 ENCOUNTER — HOSPITAL ENCOUNTER (EMERGENCY)
Facility: HOSPITAL | Age: 83
Discharge: HOME | End: 2018-11-25
Attending: EMERGENCY MEDICINE
Payer: MEDICARE

## 2018-11-25 VITALS
RESPIRATION RATE: 21 BRPM | OXYGEN SATURATION: 94 % | DIASTOLIC BLOOD PRESSURE: 70 MMHG | TEMPERATURE: 98 F | HEART RATE: 63 BPM | SYSTOLIC BLOOD PRESSURE: 137 MMHG

## 2018-11-25 DIAGNOSIS — S00.03XA HEMATOMA OF SCALP, INITIAL ENCOUNTER: ICD-10-CM
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
## 2018-11-25 PROCEDURE — 93005 ELECTROCARDIOGRAM TRACING: CPT

## 2018-11-25 PROCEDURE — 93010 ELECTROCARDIOGRAM REPORT: CPT | Performed by: EMERGENCY MEDICINE

## 2018-11-25 PROCEDURE — 70450 CT HEAD/BRAIN W/O DYE: CPT | Performed by: EMERGENCY MEDICINE

## 2018-11-25 PROCEDURE — 80048 BASIC METABOLIC PNL TOTAL CA: CPT | Performed by: EMERGENCY MEDICINE

## 2018-11-25 PROCEDURE — 85610 PROTHROMBIN TIME: CPT | Performed by: EMERGENCY MEDICINE

## 2018-11-25 PROCEDURE — 72125 CT NECK SPINE W/O DYE: CPT | Performed by: EMERGENCY MEDICINE

## 2018-11-25 PROCEDURE — 36415 COLL VENOUS BLD VENIPUNCTURE: CPT

## 2018-11-25 PROCEDURE — 85025 COMPLETE CBC W/AUTO DIFF WBC: CPT | Performed by: EMERGENCY MEDICINE

## 2018-11-25 PROCEDURE — 99285 EMERGENCY DEPT VISIT HI MDM: CPT

## 2018-11-25 NOTE — ED INITIAL ASSESSMENT (HPI)
Via EMS from [facility] s/p unwitnessed fall--found sitting between wheelchair and bed at 850AM. + head injury, left side head hematoma.  Old bruising to right side of head--per medics, injury to that side from bed grab bar a week ago    On coumadin and AS

## 2018-11-25 NOTE — ED NOTES
[facility] called to arrange for transport back to [facility] per son's request. ETA 90 min.

## 2018-11-28 NOTE — ED PROVIDER NOTES
Patient Seen in: [facility] AND [facility] Emergency Department    History   Patient presents with:  Fall    Stated Complaint: fall head injury    HPI    77-year-old male with history of dementia presents for evaluation after a fall.   Patient found next to his b reviewed and negative except as noted above.     Physical Exam     ED Triage Vitals   BP 11/25/18 1011 150/86   Pulse 11/25/18 1011 84   Resp 11/25/18 1011 20   Temp 11/25/18 1009 98.4 °F (36.9 °C)   Temp src 11/25/18 1009 Oral   SpO2 11/25/18 1011 97 %   O PLATELET.   Procedure                               Abnormality         Status                     ---------                               -----------         ------                     CBC W/ DIFFERENTIAL[[id number]]          Abnormal            Final resul Son verbalizes understanding of and agreement with this plan.      Disposition and Plan     Clinical Impression:  Fall, initial encounter  (primary encounter diagnosis)  Hematoma of scalp, initial encounter    Disposition:  Discharge  11/25/2018 12:05 pm

## 2018-12-13 PROCEDURE — 81003 URINALYSIS AUTO W/O SCOPE: CPT | Performed by: INTERNAL MEDICINE

## 2019-01-17 ENCOUNTER — APPOINTMENT (OUTPATIENT)
Dept: GENERAL RADIOLOGY | Facility: HOSPITAL | Age: 84
End: 2019-01-17
Attending: EMERGENCY MEDICINE
Payer: MEDICARE

## 2019-01-17 ENCOUNTER — APPOINTMENT (OUTPATIENT)
Dept: CT IMAGING | Facility: HOSPITAL | Age: 84
End: 2019-01-17
Attending: EMERGENCY MEDICINE
Payer: MEDICARE

## 2019-01-17 PROCEDURE — 70450 CT HEAD/BRAIN W/O DYE: CPT | Performed by: EMERGENCY MEDICINE

## 2019-01-17 PROCEDURE — 71045 X-RAY EXAM CHEST 1 VIEW: CPT | Performed by: EMERGENCY MEDICINE

## 2019-01-17 PROCEDURE — 73560 X-RAY EXAM OF KNEE 1 OR 2: CPT | Performed by: EMERGENCY MEDICINE

## 2019-01-17 NOTE — ED INITIAL ASSESSMENT (HPI)
Patient presents to ER with c/o increased confusion/altered mental status over the last 2 months - worse today. Hx dementia.

## 2019-01-17 NOTE — ED PROVIDER NOTES
Patient Seen in: [facility] AND [facility] Emergency Department    History   Patient presents with:  Altered Mental Status (neurologic)    Stated Complaint: AMS    HPI    59-year-old male with history of dementia presents for evaluation of altered mental status. AMS  Other systems are as noted in HPI. Constitutional and vital signs reviewed. All other systems reviewed and negative except as noted above.     Physical Exam     ED Triage Vitals [01/17/19 1417]   BP (!) 124/94   Pulse 75   Resp 18   Temp 98.5 °F Status                     ---------                               -----------         ------                     CBC W/ DIFFERENTIAL[[id number]]          Abnormal            Final result                 Please view results for these tests on the ind noncontrast CT. Generalized atrophy with nonspecific white matter changes most likely     related to chronic microvascular ischemia. Paranasal sinus disease.          Dictated by (CST): Gale Cárdenas MD on 1/17/2019 at 17:33         Emilia Zee Salazar MD on 1/17/2019 at 17:30               XR KNEE (1 OR 2 VIEWS), LEFT (CPT=73560)   Final Result    PROCEDURE: XR KNEE (1 OR 2 VIEWS), LEFT (CPT=73560)         COMPARISON: None. INDICATIONS: Generalized left knee pain  x2 months. No trauma. reports. Complicating Factors: The patient already has has Chronic ischemic heart disease, unspecified; Essential hypertension, benign; Prostate cancer ([address] Cardiac pacemaker [employer]; BPH with urinary obstruction; Atrial fibrillation ([address]  Hyperli blood pressure.     Medications Prescribed:  Current Discharge Medication List

## 2019-01-18 NOTE — ED PROVIDER NOTES
Patient signed out to me pending x-ray results which showed vague opacity consistent with either L atelectasis or possible pneumonia.   In discussing with family and caregiver further patient mental status has improved and he has not had cough in the last 2

## 2019-02-08 PROCEDURE — 81003 URINALYSIS AUTO W/O SCOPE: CPT | Performed by: INTERNAL MEDICINE

## 2019-04-15 ENCOUNTER — HOSPITAL ENCOUNTER (INPATIENT)
Facility: HOSPITAL | Age: 84
LOS: 6 days | Discharge: NURSING, REHAB OR LONG-TERM CARE | DRG: 194 | End: 2019-04-21
Attending: EMERGENCY MEDICINE | Admitting: HOSPITALIST
Payer: MEDICARE

## 2019-04-15 ENCOUNTER — APPOINTMENT (OUTPATIENT)
Dept: GENERAL RADIOLOGY | Facility: HOSPITAL | Age: 84
DRG: 194 | End: 2019-04-15
Attending: EMERGENCY MEDICINE
Payer: MEDICARE

## 2019-04-15 ENCOUNTER — APPOINTMENT (OUTPATIENT)
Dept: GENERAL RADIOLOGY | Facility: HOSPITAL | Age: 84
DRG: 194 | End: 2019-04-15
Payer: MEDICARE

## 2019-04-15 DIAGNOSIS — J12.3 PNEUMONIA DUE TO HUMAN METAPNEUMOVIRUS: ICD-10-CM

## 2019-04-15 DIAGNOSIS — J18.9 COMMUNITY ACQUIRED PNEUMONIA, UNSPECIFIED LATERALITY: Primary | ICD-10-CM

## 2019-04-15 PROCEDURE — 83605 ASSAY OF LACTIC ACID: CPT | Performed by: EMERGENCY MEDICINE

## 2019-04-15 PROCEDURE — 96361 HYDRATE IV INFUSION ADD-ON: CPT

## 2019-04-15 PROCEDURE — 93010 ELECTROCARDIOGRAM REPORT: CPT | Performed by: EMERGENCY MEDICINE

## 2019-04-15 PROCEDURE — 84145 PROCALCITONIN (PCT): CPT | Performed by: HOSPITALIST

## 2019-04-15 PROCEDURE — 85025 COMPLETE CBC W/AUTO DIFF WBC: CPT

## 2019-04-15 PROCEDURE — 80048 BASIC METABOLIC PNL TOTAL CA: CPT

## 2019-04-15 PROCEDURE — 83605 ASSAY OF LACTIC ACID: CPT

## 2019-04-15 PROCEDURE — 87581 M.PNEUMON DNA AMP PROBE: CPT | Performed by: EMERGENCY MEDICINE

## 2019-04-15 PROCEDURE — 85025 COMPLETE CBC W/AUTO DIFF WBC: CPT | Performed by: EMERGENCY MEDICINE

## 2019-04-15 PROCEDURE — 99285 EMERGENCY DEPT VISIT HI MDM: CPT

## 2019-04-15 PROCEDURE — 80048 BASIC METABOLIC PNL TOTAL CA: CPT | Performed by: EMERGENCY MEDICINE

## 2019-04-15 PROCEDURE — 81001 URINALYSIS AUTO W/SCOPE: CPT | Performed by: EMERGENCY MEDICINE

## 2019-04-15 PROCEDURE — 87040 BLOOD CULTURE FOR BACTERIA: CPT | Performed by: EMERGENCY MEDICINE

## 2019-04-15 PROCEDURE — 71045 X-RAY EXAM CHEST 1 VIEW: CPT | Performed by: EMERGENCY MEDICINE

## 2019-04-15 PROCEDURE — 36415 COLL VENOUS BLD VENIPUNCTURE: CPT

## 2019-04-15 PROCEDURE — 87486 CHLMYD PNEUM DNA AMP PROBE: CPT | Performed by: EMERGENCY MEDICINE

## 2019-04-15 PROCEDURE — 87798 DETECT AGENT NOS DNA AMP: CPT | Performed by: EMERGENCY MEDICINE

## 2019-04-15 PROCEDURE — 93005 ELECTROCARDIOGRAM TRACING: CPT

## 2019-04-15 PROCEDURE — 87633 RESP VIRUS 12-25 TARGETS: CPT | Performed by: EMERGENCY MEDICINE

## 2019-04-15 PROCEDURE — 96365 THER/PROPH/DIAG IV INF INIT: CPT

## 2019-04-15 RX ORDER — SODIUM CHLORIDE 9 MG/ML
500 INJECTION, SOLUTION INTRAVENOUS ONCE
Status: COMPLETED | OUTPATIENT
Start: 2019-04-15 | End: 2019-04-15

## 2019-04-15 RX ORDER — ACETAMINOPHEN 325 MG/1
650 TABLET ORAL EVERY 6 HOURS PRN
Status: DISCONTINUED | OUTPATIENT
Start: 2019-04-15 | End: 2019-04-21

## 2019-04-15 RX ORDER — IPRATROPIUM BROMIDE AND ALBUTEROL SULFATE 2.5; .5 MG/3ML; MG/3ML
3 SOLUTION RESPIRATORY (INHALATION) EVERY 6 HOURS PRN
Status: DISCONTINUED | OUTPATIENT
Start: 2019-04-15 | End: 2019-04-21

## 2019-04-15 RX ORDER — MEMANTINE HYDROCHLORIDE 10 MG/1
10 TABLET ORAL 2 TIMES DAILY
Status: DISCONTINUED | OUTPATIENT
Start: 2019-04-15 | End: 2019-04-21

## 2019-04-15 RX ORDER — DONEPEZIL HYDROCHLORIDE 10 MG/1
10 TABLET, FILM COATED ORAL NIGHTLY
Status: DISCONTINUED | OUTPATIENT
Start: 2019-04-16 | End: 2019-04-21

## 2019-04-15 RX ORDER — ALFUZOSIN HYDROCHLORIDE 10 MG/1
10 TABLET, EXTENDED RELEASE ORAL
Status: DISCONTINUED | OUTPATIENT
Start: 2019-04-16 | End: 2019-04-21

## 2019-04-15 RX ORDER — SODIUM CHLORIDE 0.9 % (FLUSH) 0.9 %
3 SYRINGE (ML) INJECTION AS NEEDED
Status: DISCONTINUED | OUTPATIENT
Start: 2019-04-15 | End: 2019-04-21

## 2019-04-15 RX ORDER — ACETAMINOPHEN 650 MG/1
650 SUPPOSITORY RECTAL ONCE
Status: COMPLETED | OUTPATIENT
Start: 2019-04-15 | End: 2019-04-15

## 2019-04-15 RX ORDER — CITALOPRAM 20 MG/1
10 TABLET ORAL DAILY
Status: DISCONTINUED | OUTPATIENT
Start: 2019-04-16 | End: 2019-04-20

## 2019-04-15 RX ORDER — LEVOTHYROXINE SODIUM 0.07 MG/1
75 TABLET ORAL
Status: DISCONTINUED | OUTPATIENT
Start: 2019-04-16 | End: 2019-04-21

## 2019-04-15 RX ORDER — PANTOPRAZOLE SODIUM 40 MG/1
40 TABLET, DELAYED RELEASE ORAL
Status: DISCONTINUED | OUTPATIENT
Start: 2019-04-16 | End: 2019-04-21

## 2019-04-15 RX ORDER — SODIUM CHLORIDE 9 MG/ML
INJECTION, SOLUTION INTRAVENOUS
Status: COMPLETED
Start: 2019-04-15 | End: 2019-04-15

## 2019-04-15 RX ORDER — HEPARIN SODIUM 5000 [USP'U]/ML
5000 INJECTION, SOLUTION INTRAVENOUS; SUBCUTANEOUS EVERY 8 HOURS SCHEDULED
Status: DISCONTINUED | OUTPATIENT
Start: 2019-04-16 | End: 2019-04-21

## 2019-04-15 RX ORDER — ASPIRIN 325 MG
325 TABLET ORAL DAILY
Status: DISCONTINUED | OUTPATIENT
Start: 2019-04-16 | End: 2019-04-21

## 2019-04-15 RX ORDER — ONDANSETRON 2 MG/ML
4 INJECTION INTRAMUSCULAR; INTRAVENOUS EVERY 6 HOURS PRN
Status: DISCONTINUED | OUTPATIENT
Start: 2019-04-15 | End: 2019-04-21

## 2019-04-16 PROBLEM — B97.81 INFECTION DUE TO HUMAN METAPNEUMOVIRUS (HMPV): Status: ACTIVE | Noted: 2019-04-15

## 2019-04-16 PROCEDURE — 83880 ASSAY OF NATRIURETIC PEPTIDE: CPT | Performed by: HOSPITALIST

## 2019-04-16 PROCEDURE — 83735 ASSAY OF MAGNESIUM: CPT | Performed by: HOSPITALIST

## 2019-04-16 PROCEDURE — 94640 AIRWAY INHALATION TREATMENT: CPT

## 2019-04-16 PROCEDURE — 92610 EVALUATE SWALLOWING FUNCTION: CPT

## 2019-04-16 PROCEDURE — 97166 OT EVAL MOD COMPLEX 45 MIN: CPT

## 2019-04-16 PROCEDURE — 97530 THERAPEUTIC ACTIVITIES: CPT

## 2019-04-16 PROCEDURE — 97535 SELF CARE MNGMENT TRAINING: CPT

## 2019-04-16 PROCEDURE — 97162 PT EVAL MOD COMPLEX 30 MIN: CPT

## 2019-04-16 PROCEDURE — 85025 COMPLETE CBC W/AUTO DIFF WBC: CPT | Performed by: HOSPITALIST

## 2019-04-16 PROCEDURE — 80053 COMPREHEN METABOLIC PANEL: CPT | Performed by: HOSPITALIST

## 2019-04-16 RX ORDER — FUROSEMIDE 10 MG/ML
20 INJECTION INTRAMUSCULAR; INTRAVENOUS ONCE
Status: COMPLETED | OUTPATIENT
Start: 2019-04-16 | End: 2019-04-16

## 2019-04-16 RX ORDER — SENNA AND DOCUSATE SODIUM 50; 8.6 MG/1; MG/1
1 TABLET, FILM COATED ORAL DAILY
Status: DISCONTINUED | OUTPATIENT
Start: 2019-04-16 | End: 2019-04-18

## 2019-04-16 RX ORDER — POTASSIUM CHLORIDE 14.9 MG/ML
20 INJECTION INTRAVENOUS ONCE
Status: COMPLETED | OUTPATIENT
Start: 2019-04-16 | End: 2019-04-16

## 2019-04-16 RX ORDER — SACCHAROMYCES BOULARDII 250 MG
250 CAPSULE ORAL DAILY
Status: DISCONTINUED | OUTPATIENT
Start: 2019-04-16 | End: 2019-04-21

## 2019-04-16 RX ORDER — POLYETHYLENE GLYCOL 3350 17 G/17G
17 POWDER, FOR SOLUTION ORAL DAILY
Status: DISCONTINUED | OUTPATIENT
Start: 2019-04-16 | End: 2019-04-18

## 2019-04-16 RX ORDER — IPRATROPIUM BROMIDE AND ALBUTEROL SULFATE 2.5; .5 MG/3ML; MG/3ML
3 SOLUTION RESPIRATORY (INHALATION)
Status: DISCONTINUED | OUTPATIENT
Start: 2019-04-16 | End: 2019-04-17

## 2019-04-16 NOTE — OCCUPATIONAL THERAPY NOTE
OCCUPATIONAL THERAPY EVALUATION - INPATIENT     Room Number: 527/527-A  Evaluation Date: 4/16/2019  Type of Evaluation: Initial  Presenting Problem: (fever, cough)    Physician Order: IP Consult to Occupational Therapy  Reason for Therapy: ADL/IADL Dysfunc reorientation;Patient/Family training       OCCUPATIONAL THERAPY MEDICAL/SOCIAL HISTORY     Problem List  Principal Problem:    Infection due to human metapneumovirus (hMPV)  Active Problems:    Community acquired pneumonia    Community acquired pneumonia, himself and was managing ~20' functional mobility. Pt wa managing 6 stairs with min A. Pt has 24 hour male caregiver.  Pt lives with his wife who per dtr, \"dotes on him\"    SUBJECTIVE  \"Hi\"    OCCUPATIONAL THERAPY EXAMINATION      OBJECTIVE  Precautio role of acute therapy and initial discharge recommendations.  Encourged family/pt's CG to allow pt opportunities as reasonable to perform simple feeding/self care, AROM throughout the day  Patient End of Session: Up in chair;Needs met;Call light within reac

## 2019-04-16 NOTE — PHYSICAL THERAPY NOTE
PHYSICAL THERAPY EVALUATION - INPATIENT     Room Number: 527/527-A  Evaluation Date: 4/16/2019  Type of Evaluation: Initial   Physician Order: PT Eval and Treat    Presenting Problem: p/w fever, cough, hypoxia  Reason for Therapy: Mobility Dysfunction and The patient's Approx Degree of Impairment: 76.75% has been calculated based on documentation in the [facility] '6 clicks' Inpatient Basic Mobility Short Form. Research supports that patients with this level of impairment may benefit from LTAC.  However, pt was 1/8/08    diverticulosis   • COLONOSCOPY,DIAGNOSTIC  12/9/05    wnl   • COLONOSCOPY,DIAGNOSTIC  11/19/03   • COLONOSCOPY,DIAGNOSTIC  11/15/02   • COLONOSCOPY,DIAGNOSTIC  3/7/12    wnl   • OTHER SURGICAL HISTORY  1999    removed portion of colon   • OTHER S bilaterally, able to move BLE against gravity; MAX A x 2 SPT    BALANCE  Static Sitting: Poor +  Dynamic Sitting: Poor  Static Standing: Dependent  Dynamic Standing: Dependent    ACTIVITY TOLERANCE  Pre-activity, supine:  SPO2 92% 4L  HR 66 bpm  BP  is: unable to state; dtr would like pt to able to ambulate around home again with assist    Goal #1 Patient is able to demonstrate supine - sit EOB @ level: moderate assistance     Goal #1   Current Status    Goal #2 Patient is able to demonstrate transfer

## 2019-04-16 NOTE — CM/SW NOTE
Patient enrolled in the [facility]/Medicare program. Patient will have 3 months from [address] [place] [address] after discharge from [facility]. Patient was enrolled under .   [facility]/Medicare letter and brochure provided

## 2019-04-16 NOTE — PLAN OF CARE
Problem: Patient Centered Care  Goal: Patient preferences are identified and integrated in the patient's plan of care  Description  Interventions:  - What would you like us to know as we care for you?  From home with family and caregiver  - Provide timely Incentive Spirometry  - Assess the need for suctioning and perform as needed  - Assess and instruct to report SOB or any respiratory difficulty  - Respiratory Therapy support as indicated  - Manage/alleviate anxiety  - Monitor for signs/symptoms of CO2 ret fluids and medications as ordered and appropriate  - Administer supportive blood products/factors as ordered and appropriate  Outcome: Progressing  Goal: Free from bleeding injury  Description  (Example usage: patient with low platelets)  INTERVENTIONS:  - mobility/gait  Description  Interventions:  - Assess patient's functional ability and stability  - Promote increasing activity/tolerance for mobility and gait  - Educate and engage patient/family in tolerated activity level and precautions  - Recommend use

## 2019-04-16 NOTE — ED PROVIDER NOTES
Patient Seen in: [facility] Emergency Department    History   Patient presents with:  Fever (infectious)    Stated Complaint: IC [place], fever 102.1 rectal     HPI    Patient is an 27-year-old male who arrives with fever times 1 day.   He was see No      Review of Systems    Positive for stated complaint: IC [place], fever 102.1 rectal   Other systems are as noted in HPI. Constitutional and vital signs reviewed. All other systems reviewed and negative except as noted above.     Physical Exam WITH DIFFERENTIAL WITH PLATELET.   Procedure                               Abnormality         Status                     ---------                               -----------         ------                     CBC W/ DIFFERENTIAL[[id number]]          Abnormal Prescribed:  Current Discharge Medication List        Present on Admission  Date Reviewed: 1/17/2019          ICD-10-CM Noted POA    Community acquired pneumonia J18.9 4/15/2019 Unknown

## 2019-04-16 NOTE — ED NOTES
Orders for admission, patient is aware of plan and ready to go upstairs.  Any questions, please call ED DAYANA Purcell at extension [phone]

## 2019-04-16 NOTE — CM/SW NOTE
04/16/19 1300   CM/SW Referral Data   Referral Source Nurse;   Patient Info   Patient's Mental Status Memory Impairments;Confused   Patient's [address]   Patient lives with Spouse; Children   Patient Status Prior to Admission   I

## 2019-04-16 NOTE — CONSULTS
Pulmonary H&P/Consult     NAME: Sergei Francois - ROOM: [address] [place] [address] - MRN: [id number] - Age: 80year old - :  8/10/1932    Date of Admission: 4/15/2019  6:52 PM  Admission Diagnosis: Pneumonia due to human metapneumovirus [J12.3]  Community acquired p 2   • COLONOSCOPY,DIAGNOSTIC  1/8/08    diverticulosis   • COLONOSCOPY,DIAGNOSTIC  12/9/05    wnl   • COLONOSCOPY,DIAGNOSTIC  11/19/03   • COLONOSCOPY,DIAGNOSTIC  11/15/02   • COLONOSCOPY,DIAGNOSTIC  3/7/12    wnl   • OTHER SURGICAL HISTORY  1999    remove respiratory distress. HEENT: Atraumatic, Lips, mucosa, and tongue dry. No thrush noted. Lungs: diminished   Chest wall: No tenderness or deformity. Heart: Regular rate and rhythm, normal S1S2, no murmur.    Abdomen: soft, non-tender, non-distended, p

## 2019-04-16 NOTE — ED NOTES
Pt oxygen saturation at 88 % at this time while asleep, per pt's family pt has history of sleep apnea, pt started on 2LPM of oxygen by nasal cannula.

## 2019-04-16 NOTE — SLP NOTE
ADULT SWALLOWING EVALUATION    ASSESSMENT    ASSESSMENT/OVERALL IMPRESSION:  Pt seen sitting upright in bed for all PO trials and evaluation. Pt with good oral acceptance and bilabial seal across all trials via tsp amount.  No anterior loss of bolus on any thick    Compensatory Strategies Recommended: No straws; Liquids via spoon; Alternate consistencies;Small bites and sips;Multiple swallows  Aspiration Precautions: Slow rate;Upright position;Small bites and sips; No straw  Medication Administration Recommenda Clear  Respiratory Status: Nasal cannula  Consistencies Trialed: Nectar thick liquids;Puree  Method of Presentation: Staff/Clinician assistance;Spoon  Patient Positioning: Upright    Oral Phase of Swallow: Impaired  Bolus Retrieval: Intact  Bilabial Seal:

## 2019-04-16 NOTE — ED NOTES
Assumed care to this pt, received report from Judith Naranjo, DAYANA per report given pt came in for complaints of fever x 1 day temp was at 102.1, cough and congestion x 2 days, pt lethargic and appears weak. caregiver and family at bedside this time, pt attached t

## 2019-04-16 NOTE — H&P
[facility] Hospitalist Team  History and Physical  Admit Date:  4/15/19    ASSESSMENT / PLAN:   79 yo male hx afib, HTN, CAD, S/P PPM, MD, colon cancer S/P resection and chemo, recurrent prostate cancer S/P brachytherapy, advanced dementia, previous SBO who prese [address] [facility] Team  Pager [phone]   Answering Service number: [phone]  4/16/2019      HISTORY:   CC: Patient presents with:  Fever (infectious)       PCP: Jayne Anthony MD    History of Present Illness:     81 yo male hx Diagnosis Date   • Atrial fibrillation ([facility] [address] 11/11/2018    Takes Coumadin   • Coronary atherosclerosis of unspecified type of vessel, native or graft 2001    has pacemaker   • Dementia    • Hx of diseases NEC     macular degeneration   • MVP (mitral valv Rfl: 0   aspirin 325 MG Oral Tab Take 1 tablet (325 mg total) by mouth daily. Disp: 90 tablet Rfl: 3   Fluticasone Propionate 50 MCG/ACT Nasal Suspension by Each Nare route daily.  Disp:  Rfl:    Azelastine HCl 0.1 % Nasal Solution 1 spray by Nasal route 2 (cpt=71045)    Result Date: 4/15/2019  CONCLUSION:  1. Mild cardiomegaly. Tortuous atherosclerotic aorta. 2. Bilateral perihilar and lower lobe pulmonary congestive changes, right worse than left. Interval progression since 1/17/2019.     Dictated by (CST in am    Anemia  -baseline hgb 11.8- 13.9  -hgb 11.4   -continue to trend    Hypothyroidism  -continue synthroid    BPH/Recurrent Prosate Ca  -uroxatrol for home flomax   -follows with Dr Bashir Argueta    CAD/SSS S/P PPM/Afib  -tele  -not on AC due to falls

## 2019-04-16 NOTE — PLAN OF CARE
Problem: Patient Centered Care  Goal: Patient preferences are identified and integrated in the patient's plan of care  Description  Interventions:  - What would you like us to know as we care for you?  From home with family and caregiver  - Provide timely Progressing  Note:   O2 saturations wnl, rr wnl, on 2L o2 NC tolerating well.  Lungs diminshed     Problem: GASTROINTESTINAL - ADULT  Goal: Maintains or returns to baseline bowel function  Description  INTERVENTIONS:  - Assess bowel function  - Maintain nan from PT/OT  - Encourage visually impaired, hearing impaired and aphasic patients to use assistive/communication devices  Outcome: Progressing  Note:   Patient is A&Ox1-alert to self which per family is his norm.  Can understand some words he is saying, must

## 2019-04-17 ENCOUNTER — APPOINTMENT (OUTPATIENT)
Dept: GENERAL RADIOLOGY | Facility: HOSPITAL | Age: 84
DRG: 194 | End: 2019-04-17
Attending: HOSPITALIST
Payer: MEDICARE

## 2019-04-17 PROCEDURE — 85025 COMPLETE CBC W/AUTO DIFF WBC: CPT | Performed by: NURSE PRACTITIONER

## 2019-04-17 PROCEDURE — 80048 BASIC METABOLIC PNL TOTAL CA: CPT | Performed by: NURSE PRACTITIONER

## 2019-04-17 PROCEDURE — 92611 MOTION FLUOROSCOPY/SWALLOW: CPT

## 2019-04-17 PROCEDURE — 97535 SELF CARE MNGMENT TRAINING: CPT

## 2019-04-17 PROCEDURE — 94640 AIRWAY INHALATION TREATMENT: CPT

## 2019-04-17 PROCEDURE — 97530 THERAPEUTIC ACTIVITIES: CPT

## 2019-04-17 PROCEDURE — 97110 THERAPEUTIC EXERCISES: CPT

## 2019-04-17 PROCEDURE — 74230 X-RAY XM SWLNG FUNCJ C+: CPT | Performed by: HOSPITALIST

## 2019-04-17 PROCEDURE — 84145 PROCALCITONIN (PCT): CPT | Performed by: INTERNAL MEDICINE

## 2019-04-17 RX ORDER — IPRATROPIUM BROMIDE AND ALBUTEROL SULFATE 2.5; .5 MG/3ML; MG/3ML
3 SOLUTION RESPIRATORY (INHALATION)
Status: DISCONTINUED | OUTPATIENT
Start: 2019-04-17 | End: 2019-04-19

## 2019-04-17 RX ORDER — POTASSIUM CHLORIDE 20 MEQ/1
40 TABLET, EXTENDED RELEASE ORAL EVERY 4 HOURS
Status: COMPLETED | OUTPATIENT
Start: 2019-04-17 | End: 2019-04-17

## 2019-04-17 NOTE — OCCUPATIONAL THERAPY NOTE
OCCUPATIONAL THERAPY TREATMENT NOTE - INPATIENT        Room Number: 527/527-A           Presenting Problem: (fever, cough)    Problem List  Principal Problem:    Infection due to human metapneumovirus (hMPV)  Active Problems:    Community acquired pneumoni activities; Functional transfer training; Endurance training;Cognitive reorientation;Patient/Family training    SUBJECTIVE  \"everyone I've asked has said no\"- referring to her request to assist pt herself with standing    OBJECTIVE  Precautions: (droplet)

## 2019-04-17 NOTE — PLAN OF CARE
Problem: Patient Centered Care  Goal: Patient preferences are identified and integrated in the patient's plan of care  Description  Interventions:  - What would you like us to know as we care for you?  From home with family and caregiver  - Provide timely Incentive Spirometry  - Assess the need for suctioning and perform as needed  - Assess and instruct to report SOB or any respiratory difficulty  - Respiratory Therapy support as indicated  - Manage/alleviate anxiety  - Monitor for signs/symptoms of CO2 ret fluids and medications as ordered and appropriate  - Administer supportive blood products/factors as ordered and appropriate  Outcome: Progressing  Goal: Free from bleeding injury  Description  (Example usage: patient with low platelets)  INTERVENTIONS:  - mobility/gait  Description  Interventions:  - Assess patient's functional ability and stability  - Promote increasing activity/tolerance for mobility and gait  - Educate and engage patient/family in tolerated activity level and precautions    Outcome: Prog

## 2019-04-17 NOTE — PROGRESS NOTES
[facility] Hospitalist Team  Progress Note    Giovanna Britany Patient Status:  Inpatient    8/10/1932 MRN [id number]   Location [facility] 5SW/SE Attending Aline Montenegro MD   Hosp Day # 2 PCP Christie Lim MD     CC: Follow Up  PCP: Oxana Wilson caretaker     ANTONINA burns in am  -IVF,none  -diet-pureed, nectar     Prophy  -SCD  -heparin     Dispo  -pending clinical coarse  Possible D/C to home with Cb Salas vs rehab  PCP: Sheri Ratliff MD        Concerns regarding plan of care were discussed with pa (FLORASTOR) cap 250 mg 250 mg Oral Daily   Senna-Docusate Sodium (SENOKOT S) 8.6-50 MG tab 1 tablet 1 tablet Oral Daily   Levothyroxine Sodium (SYNTHROID, LEVOTHROID) tab 75 mcg 75 mcg Oral Daily   Alfuzosin HCl ER (UROXATRAL) 24 hr tab 10 mg 10 mg Oral Da work of breathing  CV: RRR, no murmurs   ABD: Soft, non-tender, non-distended, +BS  MSK: moves all extremities, +cogwheel rigidity  Neuro: Grossly normal, CN intact, sensory intact, mild clonus BLE,   SKIN: warm, dry  EXT: no edema       Assessment/Plan hospitalist

## 2019-04-17 NOTE — PROGRESS NOTES
[facility] Dosing Service  Antibiotic Dosing    Diane Parker is a 80year old male for whom pharmacy is dosing Ceftriaxone for treatment of  pneumonia. .  Other antibiotics (Not dosed by pharmacy):      Allergies: is allergic to detrol [tolterodine

## 2019-04-17 NOTE — DOWNTIME EVENT NOTE
The EMR was down for 3 hours on 4/17/2019. Robe Thomas RN was responsible for completing the paper charting during this time period.      The following information was re-entered into the system by Sarah Reddy: Flowsheet data    The following information will r

## 2019-04-17 NOTE — CM/SW NOTE
CTl checked in with daughter who stated she is [facility] today    CTL will follow    Julissa Dubon, [facility] Leader  Phone # [phone]

## 2019-04-17 NOTE — PROGRESS NOTES
Pulmonary Progress Note     Assessment / Plan:  1.  Acute hypoxic respiratory failure - from viral etiology and possible bacterial superinfection (elevated PCT), mild volume overload  - wean O2 as able  - IS to bedside  - andreia prn  - supportive care for

## 2019-04-17 NOTE — SLP NOTE
ADULT VIDEOFLUOROSCOPIC SWALLOWING STUDY    Admission Date: 4/15/2019  Evaluation Date: 04/17/19  Radiologist: Carmen Gordon    RECOMMENDATIONS   Diet Recommendations - Solids: Puree  Diet Recommendations - Liquid: Nectar thick    Further Follow-up:  Follow Up Maverick Tamayo liquids;Puree;Hard solid to assess oropharyngeal swallow function and assess for compensatory strategies to improve safe swallow function. THIN LIQUIDS  Method of Presentation: Teaspoon;Straw;Cup  Oral Phase of Swallow (VFSS - Thin Liquids):  Within [employer] swallows  Laryngeal Penetration: None  Tracheal Aspiration: None  Strategy(ies) Implemented (Puree): No straws; Liquids via spoon; Slow rate; Alternate consistencies;Small bites and sips;Multple swallows  Effectiveness: Yes     HARD SOLID  Oral Phase of Swall understanding and implementation of aspiration precautions and swallow strategies independently over 3 session(s).     In Progress   Goal #3 The patient will tolerate trial upgrade of solid consistency and thin liquids without overt signs or symptoms of asp

## 2019-04-17 NOTE — PHYSICAL THERAPY NOTE
PHYSICAL THERAPY TREATMENT NOTE - INPATIENT     Room Number: 527/527-A       Presenting Problem: p/w fever, cough, hypoxia    Problem List  Principal Problem:    Infection due to human metapneumovirus (hMPV)  Active Problems:    Community acquired pneumoni from [facility]. However, pt was ambulating short household distance and performing stair navigation at home with assist prior to illness. Therefore, anticipate pt will be able to make functional gains in rehab setting, pending cognitive status.  Per dtr, pt more AM-PAC Score:  Raw Score: 10   Approx Degree of Impairment: 76.75%   Standardized Score (AM-PAC Scale): 32.29   CMS Modifier (G-Code): CL    FUNCTIONAL ABILITY STATUS  Gait Assessment   Gait Assistance: Not tested   Stoop/Curb Assistance: Not tested  C

## 2019-04-18 PROCEDURE — 85025 COMPLETE CBC W/AUTO DIFF WBC: CPT | Performed by: NURSE PRACTITIONER

## 2019-04-18 PROCEDURE — 94640 AIRWAY INHALATION TREATMENT: CPT

## 2019-04-18 PROCEDURE — 92526 ORAL FUNCTION THERAPY: CPT

## 2019-04-18 PROCEDURE — 80048 BASIC METABOLIC PNL TOTAL CA: CPT | Performed by: NURSE PRACTITIONER

## 2019-04-18 RX ORDER — CEFDINIR 300 MG/1
300 CAPSULE ORAL 2 TIMES DAILY
Qty: 6 CAPSULE | Refills: 0 | Status: SHIPPED | OUTPATIENT
Start: 2019-04-19 | End: 2019-04-21

## 2019-04-18 RX ORDER — IPRATROPIUM BROMIDE AND ALBUTEROL SULFATE 2.5; .5 MG/3ML; MG/3ML
3 SOLUTION RESPIRATORY (INHALATION) EVERY 6 HOURS PRN
Qty: 50 VIAL | Refills: 0 | Status: SHIPPED | OUTPATIENT
Start: 2019-04-18 | End: 2019-07-01 | Stop reason: ALTCHOICE

## 2019-04-18 NOTE — SLP NOTE
SPEECH DAILY NOTE - INPATIENT    ASSESSMENT & PLAN   ASSESSMENT  Pt seen sitting upright in bed for PO trials and trial upgrade to thin liquids via tsp and open cup amounts.  Caregiver present for session and participated in educational carryover for feedin Recommendations/Plan: Undetermined    Treatment Plan  Treatment Plan/Recommendations: Aspiration precautions    Interdisciplinary Communication: Discussed with RN  [facility] Ana M MADRID Score: 5   FCM Impression: Abnormal     GOALS  Goal #1 The patient will tolerate puree c In Progress     FOLLOW UP  Follow Up Needed: Yes  SLP Follow-up Date: 04/19/19  Number of Visits to Meet Established Goals: 3  Session: 1 following VFSS    Mabel Vaughn MA, [address] Pathologist  [phone]

## 2019-04-18 NOTE — PLAN OF CARE
Problem: Patient Centered Care  Goal: Patient preferences are identified and integrated in the patient's plan of care  Description  Interventions:  - What would you like us to know as we care for you?  From home with family and caregiver  - Provide timely Incentive Spirometry  - Assess the need for suctioning and perform as needed  - Assess and instruct to report SOB or any respiratory difficulty  - Respiratory Therapy support as indicated  - Manage/alleviate anxiety  - Monitor for signs/symptoms of CO2 ret internal bleeding  - Monitor lab trends  Outcome: Progressing     Problem: MUSCULOSKELETAL - ADULT  Goal: Return mobility to safest level of function  Description  INTERVENTIONS:  - Assess patient stability and activity tolerance for standing, transferring Offer food and liquids at a slow rate  - No straws  - Encourage small bites of food and small sips of liquid  - Offer pills one at a time, crush or deliver with applesauce as needed  - Discontinue feeding and notify MD (or speech pathologist) if coughing o

## 2019-04-18 NOTE — PROGRESS NOTES
[facility] Hospitalist Team  Progress Note    Giovannashanti Garg Patient Status:  Inpatient    8/10/1932 MRN [id number]   Location [facility] 5SW/SE Attending Aline Montenegro MD   Hosp Day # 3 PCP Christie Lim MD     CC: Follow Up  PCP: Oxana Wilson walker on own  -continue namenda  -follows with Dr Jaden Gomes     Depression  -continue lexapro     Insomnia  -prn melatonin     Constipation  -hx of numerous bowel obstruction  -last BM Sunday  -miralex, senokot held with diarrhea  -continue florastor  -follow 111* 100* 101* 98       Recent Labs   Lab 04/16/19  0708   ALT 18   AST 18   ALB 2.8*       No results for input(s): PGLU in the last 168 hours. No results for input(s): TROP in the last 168 hours.         MEDICATIONS        Current Facility-Administered lobe pulmonary congestive changes, right worse than left. Interval progression since 1/17/2019.     Dictated by (CST): Fer Richter MD on 4/15/2019 at 20:19     Approved by (CST): Fer Richter MD on 4/15/2019 at 20:21              SEE ATTENDI diff if ongoing diarrhea after holding bowel regimen  -follow     Hyponatremia-Resolved  -Admission Na 134-->143-->149  -S/P IVF in ER  -will see if speech can advanced liquids with increasing Na  -follow     Dsyphagia   -pureed diet at home  -Speech eval-

## 2019-04-18 NOTE — PLAN OF CARE
Problem: Patient Centered Care  Goal: Patient preferences are identified and integrated in the patient's plan of care  Description  Interventions:  - What would you like us to know as we care for you?  From home with family and caregiver  - Provide timely Incentive Spirometry  - Assess the need for suctioning and perform as needed  - Assess and instruct to report SOB or any respiratory difficulty  - Respiratory Therapy support as indicated  - Manage/alleviate anxiety  - Monitor for signs/symptoms of CO2 ret fluids and medications as ordered and appropriate  - Administer supportive blood products/factors as ordered and appropriate  Outcome: Progressing  Goal: Free from bleeding injury  Description  (Example usage: patient with low platelets)  INTERVENTIONS:  - mobility/gait  Description  Interventions:  - Assess patient's functional ability and stability  - Promote increasing activity/tolerance for mobility and gait  - Educate and engage patient/family in tolerated activity level and precautions  Outcome: Eric

## 2019-04-19 PROCEDURE — 84145 PROCALCITONIN (PCT): CPT | Performed by: NURSE PRACTITIONER

## 2019-04-19 PROCEDURE — 94640 AIRWAY INHALATION TREATMENT: CPT

## 2019-04-19 PROCEDURE — 80048 BASIC METABOLIC PNL TOTAL CA: CPT | Performed by: NURSE PRACTITIONER

## 2019-04-19 PROCEDURE — 92526 ORAL FUNCTION THERAPY: CPT

## 2019-04-19 PROCEDURE — 85025 COMPLETE CBC W/AUTO DIFF WBC: CPT | Performed by: NURSE PRACTITIONER

## 2019-04-19 PROCEDURE — 84295 ASSAY OF SERUM SODIUM: CPT | Performed by: NURSE PRACTITIONER

## 2019-04-19 RX ORDER — IPRATROPIUM BROMIDE AND ALBUTEROL SULFATE 2.5; .5 MG/3ML; MG/3ML
3 SOLUTION RESPIRATORY (INHALATION) EVERY 4 HOURS PRN
Status: DISCONTINUED | OUTPATIENT
Start: 2019-04-19 | End: 2019-04-21

## 2019-04-19 RX ORDER — DEXTROSE MONOHYDRATE 50 MG/ML
INJECTION, SOLUTION INTRAVENOUS CONTINUOUS
Status: DISCONTINUED | OUTPATIENT
Start: 2019-04-19 | End: 2019-04-19

## 2019-04-19 NOTE — RESPIRATORY THERAPY NOTE
Patient seen for pneumonia protocol. Poor effort/compliance with incentive spirometer. Pneumonia care guidelines left at bedside.

## 2019-04-19 NOTE — PLAN OF CARE
Problem: Patient Centered Care  Goal: Patient preferences are identified and integrated in the patient's plan of care  Description  Interventions:  - What would you like us to know as we care for you?  From home with family and caregiver  - Provide timely feedings (90 degrees preferred)  - Offer food and liquids at a slow rate  - No straws  - Encourage small bites of food and small sips of liquid  - Offer pills one at a time, crush or deliver with applesauce as needed  - Discontinue feeding and notify MD (o

## 2019-04-19 NOTE — PROGRESS NOTES
[facility] Hospitalist Team  Progress Note    Gissell Micheline Patient Status:  Inpatient    8/10/1932 MRN [id number]   Location [facility] 5SW/SE Attending Elen Whitmore MD   Hosp Day # 4 PCP Bbee Galloway MD     CC: Follow Up  PCP: Brinda Gorman synthroid     BPH/Recurrent Prosate Ca  -uroxatrol for home flomax   -follows with Dr Beth Murray     CAD/SSS S/P PPM/Afib  -not on AC due to falls  -follows with Dr Hammad Muir     Dementia, Advanced    -baseline confused but talkative, able to feed octavio 04/15/19  1837 04/16/19  0708 04/17/19  0654 04/18/19  0810 04/19/19  0701   WBC 7.7 6.3 6.2 4.9 5.2   HGB 11.4* 11.3* 10.9* 10.6* 10.2*   MCV 95.9 96.0 97.2 98.0 98.2   .0 178.0 185.0 201.0 215.0       Recent Labs   Lab 04/15/19  1837 04/16/19  070 Date: 4/17/2019  CONCLUSION:  ASPIRATION: None. PENETRATION:   None. OTHER:     Retention of contrast in the vallecula. Please refer to speech pathologists report for further details.     Dictated by (CST): Basilio Flores MD on 4/17/2019 at 12:02     Emilia stop   -weaned off O2  -as per pul    Diarrhea   -On miralex and senokot with hx of colon cancer and previous numerous obstructions, will stop.  Will need a bowel regimen at D/C to prevent bowel obstruction, will need to address PTD  -will send for c diff i

## 2019-04-19 NOTE — SLP NOTE
SPEECH DAILY NOTE - INPATIENT    ASSESSMENT & PLAN   ASSESSMENT  Pt seen sitting upright in bedside chair for meal assessment as pt upgraded to thin liquids via TSP on 4/18/19 and training of safe swallowing compensatory strategies.  Silva Mcgregor Aspiration precautions    Interdisciplinary Communication: Discussed with RN  [facility] Ana M MADRID Score: 5   [facility] Impression: Abnormal     GOALS  Goal #1 The patient will tolerate puree consistency and nectar liquids without overt signs or symptoms of aspiration with 100 % spontaneously utilizes a re-swallow. Continue to reinforce x1 full meal assessment.    In Progress     FOLLOW UP  Follow Up Needed: Yes  SLP Follow-up Date: 04/19/19  Number of Visits to Meet Established Goals: 3  Session: 2 following VFSS    If you have an

## 2019-04-19 NOTE — PROGRESS NOTES
Pulmonary Progress Note     Assessment / Plan:  1.  Acute hypoxic respiratory failure - from viral etiology and possible bacterial superinfection (elevated PCT), mild volume overload  - on RA  - IS to bedside  - andreia prn  - supportive care for metapneumo

## 2019-04-19 NOTE — CM/SW NOTE
Daughters choice is for rehab at [address]  Referral sent    Kelly Everett 1753 able to accept patient on [address]  Phone # [phone]

## 2019-04-20 PROCEDURE — 92526 ORAL FUNCTION THERAPY: CPT

## 2019-04-20 PROCEDURE — 80048 BASIC METABOLIC PNL TOTAL CA: CPT | Performed by: NURSE PRACTITIONER

## 2019-04-20 PROCEDURE — 87493 C DIFF AMPLIFIED PROBE: CPT | Performed by: NURSE PRACTITIONER

## 2019-04-20 PROCEDURE — 97110 THERAPEUTIC EXERCISES: CPT

## 2019-04-20 PROCEDURE — 97535 SELF CARE MNGMENT TRAINING: CPT

## 2019-04-20 RX ORDER — ESCITALOPRAM OXALATE 10 MG/1
5 TABLET ORAL DAILY
Status: DISCONTINUED | OUTPATIENT
Start: 2019-04-20 | End: 2019-04-21

## 2019-04-20 NOTE — PLAN OF CARE
Problem: Patient Centered Care  Goal: Patient preferences are identified and integrated in the patient's plan of care  Description  Interventions:  - What would you like us to know as we care for you?  From home with family and caregiver  - Provide timely minimize respiratory effort  - Oxygen supplementation based on oxygen saturation or ABGs  - Provide Smoking Cessation handout, if applicable  - Encourage broncho-pulmonary hygiene including cough, deep breathe, Incentive Spirometry  - Assess the need for s lab results as appropriate  - Fluid restriction as ordered  - Instruct patient on fluid and nutrition restrictions as appropriate  Outcome: Progressing   Na improved. Hydration encouraged.   Problem: HEMATOLOGIC - ADULT  Goal: Maintains hematologic stabilit Progressing   Up with lift to chair. OT worked with pt, up with 2 max assist, pt weak.   Problem: Impaired Swallowing  Goal: Minimize aspiration risk  Description  Interventions:  - Patient should be alert and upright for all feedings (90 degrees preferred)

## 2019-04-20 NOTE — PROGRESS NOTES
[facility] Hospitalist Progress Note     Reason for Admission: viral pneumonia  PCP: Savanna Madrid MD     Assessment/Plan:     Principal Problem:    Infection due to human metapneumovirus (hMPV)  Active Problems:    Community acquired pneumonia    Community a [place]     CAD/SSS S/P PPM/Afib  -not on AC due to falls  -follows with Dr Ria James     Dementia, Advanced    -baseline confused but talkative, able to feed self, needs help with initial standing but can walk with walker on own  -continue namenda NAD  HEENT: EOMI, PERRLA  Neck: Supple, no JVD  Pulm: CTAB, no crackles or wheezes  CV: RRR, no murmurs, 2+ peripheral pulses  ABD: Soft, non-tender, non-distended, +BS  MSK: moving all extremities spontaneously  Neuro: A&O x1 person, unable to follow othe

## 2019-04-20 NOTE — OCCUPATIONAL THERAPY NOTE
OCCUPATIONAL THERAPY TREATMENT NOTE - INPATIENT        Room Number: 527/527-A           Presenting Problem: (fever, cough)    Problem List  Principal Problem:    Infection due to human metapneumovirus (hMPV)  Active Problems:    Community acquired pneumoni some kind of rehab at discharge. DISCHARGE RECOMMENDATIONS  OT Discharge Recommendations: 24 hour care/supervision;Sub-acute rehabilitation        PLAN  OT Treatment Plan: ADL training;Balance activities; Energy conservation/work simplification techniqu n/t  Toileting: n/t   Upper Extremity Dressing: n/t   Lower Extremity Dressing: n/t     Education Provided: Role of OT, unsupported sitting & trunk strengthening, sit to stands, sitting and standing balance, BUE therapeutic exercises (A/AAROM), safety awar

## 2019-04-20 NOTE — SLP NOTE
SPEECH DAILY NOTE - INPATIENT    ASSESSMENT & PLAN   ASSESSMENT    Pt alert and on room air. Pt seen for swallow analysis per VFSS recommendations.  Pt observed upright in chair with current diet of pureed/thin liquids via tsp for monitoring of diet toleran liquid via tsp without overt signs or symptoms of aspiration with 100 % accuracy over 3 session(s).   Met      No overt clinical signs/symptoms of aspiration on any swallows of pureed nor thin liquids via tsp  (no coughing, no throat clearing, clear vocal q

## 2019-04-20 NOTE — PLAN OF CARE
Problem: Patient Centered Care  Goal: Patient preferences are identified and integrated in the patient's plan of care  Description  Interventions:  - What would you like us to know as we care for you?  From home with family and caregiver  - Provide timely Incentive Spirometry  - Assess the need for suctioning and perform as needed  - Assess and instruct to report SOB or any respiratory difficulty  - Respiratory Therapy support as indicated  - Manage/alleviate anxiety  - Monitor for signs/symptoms of CO2 ret internal bleeding  - Monitor lab trends  Outcome: Progressing     Problem: MUSCULOSKELETAL - ADULT  Goal: Return mobility to safest level of function  Description  INTERVENTIONS:  - Assess patient stability and activity tolerance for standing, transferring

## 2019-04-21 ENCOUNTER — TELEPHONE (OUTPATIENT)
Dept: FAMILY MEDICINE CLINIC | Facility: CLINIC | Age: 84
End: 2019-04-21

## 2019-04-21 VITALS
HEART RATE: 61 BPM | BODY MASS INDEX: 23.14 KG/M2 | DIASTOLIC BLOOD PRESSURE: 71 MMHG | TEMPERATURE: 98 F | SYSTOLIC BLOOD PRESSURE: 148 MMHG | WEIGHT: 144 LBS | OXYGEN SATURATION: 94 % | HEIGHT: 66 IN | RESPIRATION RATE: 22 BRPM

## 2019-04-21 PROCEDURE — 80048 BASIC METABOLIC PNL TOTAL CA: CPT | Performed by: INTERNAL MEDICINE

## 2019-04-21 NOTE — DISCHARGE SUMMARY
[facility] Hospitalist Discharge Summary   Patient ID:  Alli Galvin [id number]  57 year old 8/10/1932    Admit date: 4/15/2019  Discharge date: 4/21/2019  Risk of Readmission Lace+ Score: 80  59-90 High Risk  29-58 Medium Risk  0-28   Low Risk    Primary also with with diarrhea attributed to bowel regimen, will need to resume when able with pt hx of numerous bowel obstructions. Hyponatremia in setting of modified diet.  Ultimately discharged to [facility]     Acute Hypoxemic Respiratory Failure non labored, CTA  CARDIO: Regular, no murmur  ABD: soft, NT, ND, BS+  EXTREMITIES: no edema, no calf tenderness, SCD in place    Operative Procedures:   Radiology:         Discharge Instructions     Medication List      START taking these medications    ip Partial Code    Important follow up: Follow-up Information     Vishnu Good MD In 1 week.     Specialty:  PULMONARY DISEASES  Contact information:  [address]             Yee Jacobo MD In

## 2019-04-21 NOTE — CM/SW NOTE
RN informed CLARE that pt is medically stable to discharge today and will need ambulance transport (complete, +metapneumavirus).  CLARE spoke w/ Curly Mark from [facility] and arranged ambulance transport to [place] [address] at 2pm.     CLARE updated dtr regarding di

## 2019-04-21 NOTE — PLAN OF CARE
Patient presents with vss. Patient is complete care, incontinent of bowel and bladder. On fall precautions, droplet precautions. Family and caregiver at bedside during the evening. Fall precautions and prompt incontinence care provided. Turning frequently.

## 2019-04-21 NOTE — PLAN OF CARE
Problem: Patient Centered Care  Goal: Patient preferences are identified and integrated in the patient's plan of care  Description  Interventions:  - What would you like us to know as we care for you?  From home with family and caregiver  - Provide timely hygiene including cough, deep breathe, Incentive Spirometry  - Assess the need for suctioning and perform as needed  - Assess and instruct to report SOB or any respiratory difficulty  - Respiratory Therapy support as indicated  - Manage/alleviate anxiety labs and vital signs for trends  - Administer supportive blood products/factors, fluids and medications as ordered and appropriate  - Administer supportive blood products/factors as ordered and appropriate  Outcome: Adequate for Discharge  Goal: Free from assistive/communication devices  Outcome: Adequate for Discharge     Problem: Impaired Functional Mobility  Goal: Achieve highest/safest level of mobility/gait  Description  Interventions:  - Assess patient's functional ability and stability  - Promote inc

## 2019-04-21 NOTE — PHYSICAL THERAPY NOTE
Attempted tx, Pt sitting up in recliner, not following commands despite of max verbal stimuli, son at bedside and encouraging as well. Pt mumbling words, h/o dementia. Will re-attempt asap, communicated w/ RN.

## 2019-04-22 ENCOUNTER — SNF ADMIT/H&P (OUTPATIENT)
Dept: INTERNAL MEDICINE CLINIC | Facility: SKILLED NURSING FACILITY | Age: 84
End: 2019-04-22

## 2019-04-22 DIAGNOSIS — B97.81 INFECTION DUE TO HUMAN METAPNEUMOVIRUS (HMPV): ICD-10-CM

## 2019-04-22 DIAGNOSIS — F03.90 DEMENTIA WITHOUT BEHAVIORAL DISTURBANCE, UNSPECIFIED DEMENTIA TYPE (HCC): ICD-10-CM

## 2019-04-22 DIAGNOSIS — R53.1 WEAKNESS: ICD-10-CM

## 2019-04-22 PROCEDURE — 99310 SBSQ NF CARE HIGH MDM 45: CPT | Performed by: NURSE PRACTITIONER

## 2019-04-22 PROCEDURE — 1123F ACP DISCUSS/DSCN MKR DOCD: CPT | Performed by: NURSE PRACTITIONER

## 2019-04-22 NOTE — PROGRESS NOTES
HPI: Carmella Gallardo  Is an 81 yo male with a hx afib, HTN, CAD, S/P PPM, MD, colon cancer S/P resection and chemo, recurrent prostate cancer S/P brachytherapy, advanced dementia,multiple SBO who was admitted to [address] [place] [address] 4/15/19 with fever and cough and fou Smokeless tobacco: Never Used    Alcohol use: Not Currently    Drug use: No      ALLERGIES:    Detrol [Tolterodine]    RASH, DIZZINESS    CODE STATUS:  Full Code, considering partial code (intubation but no chest compression) but still undecided  per daug consult in rehab      Diarrhea - none currently in rehab   -On miralax and senokot at home with hx of colon cancer and previous numerous obstructions, per staff BMs are normal/regular- restart miralax daily, senna PRN, monitor for diarrhea   - c diff negat

## 2019-04-22 NOTE — TELEPHONE ENCOUNTER
On call note: Was called on 4/21/19 by nurse at [facility] that patient was admitted.  Approved orders and will notify Dr Ekaterina Andrews

## 2019-04-23 ENCOUNTER — EXTERNAL FACILITY (OUTPATIENT)
Dept: FAMILY MEDICINE CLINIC | Facility: CLINIC | Age: 84
End: 2019-04-23

## 2019-04-23 DIAGNOSIS — F03.90 DEMENTIA WITHOUT BEHAVIORAL DISTURBANCE, UNSPECIFIED DEMENTIA TYPE (HCC): ICD-10-CM

## 2019-04-23 DIAGNOSIS — R79.89 AZOTEMIA: ICD-10-CM

## 2019-04-23 DIAGNOSIS — J18.9 COMMUNITY ACQUIRED PNEUMONIA, UNSPECIFIED LATERALITY: ICD-10-CM

## 2019-04-23 PROCEDURE — 99305 1ST NF CARE MODERATE MDM 35: CPT | Performed by: FAMILY MEDICINE

## 2019-04-29 ENCOUNTER — MED REC SCAN ONLY (OUTPATIENT)
Dept: FAMILY MEDICINE CLINIC | Facility: CLINIC | Age: 84
End: 2019-04-29

## 2019-05-09 ENCOUNTER — TELEPHONE (OUTPATIENT)
Dept: FAMILY MEDICINE CLINIC | Facility: CLINIC | Age: 84
End: 2019-05-09

## 2019-05-09 NOTE — TELEPHONE ENCOUNTER
Aleshia/[facility] called in stating that Pt discharging on Saturday 5-11 from [facility], they can not see him until 5-15 Wed due to staffing. Family is aware and wants to stay with residential for their services.     She wanted to make Dr. Aura coon

## 2020-07-19 ENCOUNTER — APPOINTMENT (OUTPATIENT)
Dept: CT IMAGING | Age: 85
DRG: 177 | End: 2020-07-19
Attending: EMERGENCY MEDICINE

## 2020-07-19 ENCOUNTER — APPOINTMENT (OUTPATIENT)
Dept: GENERAL RADIOLOGY | Age: 85
DRG: 177 | End: 2020-07-19
Attending: EMERGENCY MEDICINE

## 2020-07-19 ENCOUNTER — HOSPITAL ENCOUNTER (INPATIENT)
Age: 85
LOS: 4 days | Discharge: HOME HEALTH | DRG: 177 | End: 2020-07-24
Attending: EMERGENCY MEDICINE | Admitting: HOSPITALIST

## 2020-07-19 DIAGNOSIS — F03.90 SENILE DEMENTIA, UNCOMPLICATED (CMD): ICD-10-CM

## 2020-07-19 DIAGNOSIS — K81.0 ACUTE CHOLECYSTITIS: ICD-10-CM

## 2020-07-19 DIAGNOSIS — R41.82 ALTERED MENTAL STATUS, UNSPECIFIED ALTERED MENTAL STATUS TYPE: Primary | ICD-10-CM

## 2020-07-19 LAB
ALBUMIN SERPL-MCNC: 3.5 G/DL (ref 3.6–5.1)
ALBUMIN/GLOB SERPL: 0.7 {RATIO} (ref 1–2.4)
ALP SERPL-CCNC: 60 UNITS/L (ref 45–117)
ALT SERPL-CCNC: 30 UNITS/L
ANION GAP SERPL CALC-SCNC: 9 MMOL/L (ref 10–20)
APPEARANCE UR: CLEAR
AST SERPL-CCNC: 28 UNITS/L
BACTERIA #/AREA URNS HPF: ABNORMAL /HPF
BASOPHILS # BLD: 0 K/MCL (ref 0–0.3)
BASOPHILS NFR BLD: 0 %
BILIRUB SERPL-MCNC: 0.7 MG/DL (ref 0.2–1)
BILIRUB UR QL STRIP: NEGATIVE
BUN SERPL-MCNC: 29 MG/DL (ref 6–20)
BUN/CREAT SERPL: 27 (ref 7–25)
CALCIUM SERPL-MCNC: 8.7 MG/DL (ref 8.4–10.2)
CHLORIDE SERPL-SCNC: 100 MMOL/L (ref 98–107)
CO2 SERPL-SCNC: 30 MMOL/L (ref 21–32)
COLOR UR: ABNORMAL
CREAT SERPL-MCNC: 1.07 MG/DL (ref 0.67–1.17)
DIFFERENTIAL METHOD BLD: ABNORMAL
EOSINOPHIL # BLD: 0.1 K/MCL (ref 0.1–0.5)
EOSINOPHIL NFR BLD: 1 %
ERYTHROCYTE [DISTWIDTH] IN BLOOD: 16.7 % (ref 11–15)
GLOBULIN SER-MCNC: 4.8 G/DL (ref 2–4)
GLUCOSE SERPL-MCNC: 112 MG/DL (ref 65–99)
GLUCOSE UR STRIP-MCNC: NEGATIVE MG/DL
HCT VFR BLD CALC: 39.3 % (ref 39–51)
HGB BLD-MCNC: 12.8 G/DL (ref 13–17)
HGB UR QL STRIP: NEGATIVE
HYALINE CASTS #/AREA URNS LPF: ABNORMAL /LPF (ref 0–5)
IMM GRANULOCYTES # BLD AUTO: 0.1 K/MCL (ref 0–0.2)
IMM GRANULOCYTES NFR BLD: 1 %
KETONES UR STRIP-MCNC: NEGATIVE MG/DL
LACTATE BLDV-MCNC: 1.5 MMOL/L
LEUKOCYTE ESTERASE UR QL STRIP: NEGATIVE
LYMPHOCYTES # BLD: 0.6 K/MCL (ref 1–4)
LYMPHOCYTES NFR BLD: 4 %
MAGNESIUM SERPL-MCNC: 2.2 MG/DL (ref 1.7–2.4)
MCH RBC QN AUTO: 32.5 PG (ref 26–34)
MCHC RBC AUTO-ENTMCNC: 32.6 G/DL (ref 32–36.5)
MCV RBC AUTO: 99.7 FL (ref 78–100)
MONOCYTES # BLD: 1.1 K/MCL (ref 0.3–0.9)
MONOCYTES NFR BLD: 8 %
MUCOUS THREADS URNS QL MICRO: PRESENT
NEUTROPHILS # BLD: 12.3 K/MCL (ref 1.8–7.7)
NEUTROPHILS NFR BLD: 86 %
NITRITE UR QL STRIP: NEGATIVE
NRBC BLD MANUAL-RTO: 0 /100 WBC
PH UR STRIP: 5 UNITS (ref 5–7)
PLATELET # BLD: 192 K/MCL (ref 140–450)
POTASSIUM SERPL-SCNC: 4.3 MMOL/L (ref 3.4–5.1)
PROCALCITONIN SERPL IA-MCNC: <0.05 NG/ML
PROT SERPL-MCNC: 8.3 G/DL (ref 6.4–8.2)
PROT UR STRIP-MCNC: 100 MG/DL
RBC # BLD: 3.94 MIL/MCL (ref 4.5–5.9)
RBC #/AREA URNS HPF: ABNORMAL /HPF (ref 0–2)
SODIUM SERPL-SCNC: 135 MMOL/L (ref 135–145)
SP GR UR STRIP: 1.03 (ref 1–1.03)
SPECIMEN SOURCE: ABNORMAL
SQUAMOUS #/AREA URNS HPF: ABNORMAL /HPF (ref 0–5)
TROPONIN I SERPL HS-MCNC: <0.02 NG/ML
UROBILINOGEN UR STRIP-MCNC: 0.2 MG/DL (ref 0–1)
WBC # BLD: 14.3 K/MCL (ref 4.2–11)
WBC #/AREA URNS HPF: ABNORMAL /HPF (ref 0–5)

## 2020-07-19 PROCEDURE — 70450 CT HEAD/BRAIN W/O DYE: CPT

## 2020-07-19 PROCEDURE — 93005 ELECTROCARDIOGRAM TRACING: CPT | Performed by: EMERGENCY MEDICINE

## 2020-07-19 PROCEDURE — 71045 X-RAY EXAM CHEST 1 VIEW: CPT

## 2020-07-19 PROCEDURE — 81001 URINALYSIS AUTO W/SCOPE: CPT

## 2020-07-19 PROCEDURE — 85025 COMPLETE CBC W/AUTO DIFF WBC: CPT

## 2020-07-19 PROCEDURE — 80053 COMPREHEN METABOLIC PANEL: CPT

## 2020-07-19 PROCEDURE — 74177 CT ABD & PELVIS W/CONTRAST: CPT

## 2020-07-19 PROCEDURE — 83605 ASSAY OF LACTIC ACID: CPT

## 2020-07-19 PROCEDURE — C9803 HOPD COVID-19 SPEC COLLECT: HCPCS

## 2020-07-19 PROCEDURE — 83735 ASSAY OF MAGNESIUM: CPT

## 2020-07-19 PROCEDURE — 84484 ASSAY OF TROPONIN QUANT: CPT

## 2020-07-19 PROCEDURE — 36415 COLL VENOUS BLD VENIPUNCTURE: CPT

## 2020-07-19 PROCEDURE — 87635 SARS-COV-2 COVID-19 AMP PRB: CPT

## 2020-07-19 PROCEDURE — 10002805 HB CONTRAST AGENT: Performed by: EMERGENCY MEDICINE

## 2020-07-19 PROCEDURE — 87040 BLOOD CULTURE FOR BACTERIA: CPT

## 2020-07-19 PROCEDURE — 10004281 HB COUNTER-STAFF TIME PER 15 MIN

## 2020-07-19 PROCEDURE — 84145 PROCALCITONIN (PCT): CPT

## 2020-07-19 PROCEDURE — 99285 EMERGENCY DEPT VISIT HI MDM: CPT

## 2020-07-19 RX ORDER — AZELASTINE 1 MG/ML
1 SPRAY, METERED NASAL 2 TIMES DAILY
COMMUNITY

## 2020-07-19 RX ADMIN — IOHEXOL 100 ML: 350 INJECTION, SOLUTION INTRAVENOUS at 23:30

## 2020-07-20 ENCOUNTER — APPOINTMENT (OUTPATIENT)
Dept: ULTRASOUND IMAGING | Age: 85
DRG: 177 | End: 2020-07-20
Attending: INTERNAL MEDICINE

## 2020-07-20 ENCOUNTER — APPOINTMENT (OUTPATIENT)
Dept: INTERVENTIONAL RADIOLOGY/VASCULAR | Age: 85
DRG: 177 | End: 2020-07-20
Attending: SURGERY

## 2020-07-20 LAB
ALBUMIN SERPL-MCNC: 2.7 G/DL (ref 3.6–5.1)
ALBUMIN/GLOB SERPL: 0.7 {RATIO} (ref 1–2.4)
ALP SERPL-CCNC: 47 UNITS/L (ref 45–117)
ALT SERPL-CCNC: 21 UNITS/L
AMMONIA PLAS-SCNC: 19 MCMOL/L
ANION GAP SERPL CALC-SCNC: 8 MMOL/L (ref 10–20)
AST SERPL-CCNC: 16 UNITS/L
ATRIAL RATE (BPM): 375
BASE EXCESS BLDA CALC-SCNC: 7 MMOL/L (ref 0–3)
BASOPHILS # BLD: 0 K/MCL (ref 0–0.3)
BASOPHILS NFR BLD: 0 %
BDY SITE: ABNORMAL
BILIRUB SERPL-MCNC: 0.7 MG/DL (ref 0.2–1)
BODY TEMPERATURE: 37 DEGREES
BUN SERPL-MCNC: 23 MG/DL (ref 6–20)
BUN/CREAT SERPL: 26 (ref 7–25)
CALCIUM SERPL-MCNC: 8.3 MG/DL (ref 8.4–10.2)
CHLORIDE SERPL-SCNC: 102 MMOL/L (ref 98–107)
CO2 SERPL-SCNC: 30 MMOL/L (ref 21–32)
CONDITION-RC: ABNORMAL
CREAT SERPL-MCNC: 0.89 MG/DL (ref 0.67–1.17)
DIFFERENTIAL METHOD BLD: ABNORMAL
EOSINOPHIL # BLD: 0.1 K/MCL (ref 0.1–0.5)
EOSINOPHIL NFR BLD: 1 %
ERYTHROCYTE [DISTWIDTH] IN BLOOD: 16.2 % (ref 11–15)
GLOBULIN SER-MCNC: 3.7 G/DL (ref 2–4)
GLUCOSE SERPL-MCNC: 102 MG/DL (ref 65–99)
HCO3 BLDA-SCNC: 31 MMOL/L (ref 22–28)
HCT VFR BLD CALC: 35 % (ref 39–51)
HGB BLD-MCNC: 11.2 G/DL (ref 13–17)
IMM GRANULOCYTES # BLD AUTO: 0.1 K/MCL (ref 0–0.2)
IMM GRANULOCYTES NFR BLD: 1 %
INR PPP: 1.2
LYMPHOCYTES # BLD: 0.6 K/MCL (ref 1–4)
LYMPHOCYTES NFR BLD: 5 %
MCH RBC QN AUTO: 31.1 PG (ref 26–34)
MCHC RBC AUTO-ENTMCNC: 32 G/DL (ref 32–36.5)
MCV RBC AUTO: 97.2 FL (ref 78–100)
MONOCYTES # BLD: 1.1 K/MCL (ref 0.3–0.9)
MONOCYTES NFR BLD: 9 %
NEUTROPHILS # BLD: 10.3 K/MCL (ref 1.8–7.7)
NEUTROPHILS NFR BLD: 84 %
NRBC BLD MANUAL-RTO: 0 /100 WBC
P AXIS (DEGREES): 94
PCO2 BLDA: 42 MM HG (ref 35–48)
PH BLDA: 7.48 UNITS (ref 7.35–7.45)
PLATELET # BLD: 172 K/MCL (ref 140–450)
PO2 BLDA: 96 MM HG (ref 83–108)
POTASSIUM SERPL-SCNC: 4.2 MMOL/L (ref 3.4–5.1)
PR-INTERVAL (MSEC): 162
PROT SERPL-MCNC: 6.4 G/DL (ref 6.4–8.2)
PROTHROMBIN TIME: 12.7 SEC (ref 9.7–11.8)
QRS-INTERVAL (MSEC): 130
QT-INTERVAL (MSEC): 452
QTC: 459
R AXIS (DEGREES): -33
RBC # BLD: 3.6 MIL/MCL (ref 4.5–5.9)
REPORT TEXT: NORMAL
SAO2 % BLDA: 98 % (ref 95–99)
SARS-COV-2 RNA RESP QL NAA+PROBE: NOT DETECTED
SERVICE CMNT-IMP: NORMAL
SODIUM SERPL-SCNC: 136 MMOL/L (ref 135–145)
SPECIMEN SOURCE: NORMAL
T AXIS (DEGREES): -43
VENTRICULAR RATE EKG/MIN (BPM): 62
WBC # BLD: 12.1 K/MCL (ref 4.2–11)

## 2020-07-20 PROCEDURE — 10003585 HB ROOM CHARGE INTERMEDIATE CARE

## 2020-07-20 PROCEDURE — 85610 PROTHROMBIN TIME: CPT

## 2020-07-20 PROCEDURE — 76705 ECHO EXAM OF ABDOMEN: CPT

## 2020-07-20 PROCEDURE — 82140 ASSAY OF AMMONIA: CPT

## 2020-07-20 PROCEDURE — 10006023 HB SUPPLY 272

## 2020-07-20 PROCEDURE — 13003289 HB OXYGEN THERAPY DAILY

## 2020-07-20 PROCEDURE — 0F9430Z DRAINAGE OF GALLBLADDER WITH DRAINAGE DEVICE, PERCUTANEOUS APPROACH: ICD-10-PCS | Performed by: RADIOLOGY

## 2020-07-20 PROCEDURE — 36600 WITHDRAWAL OF ARTERIAL BLOOD: CPT

## 2020-07-20 PROCEDURE — 10002800 HB RX 250 W HCPCS: Performed by: INTERNAL MEDICINE

## 2020-07-20 PROCEDURE — C1769 GUIDE WIRE: HCPCS

## 2020-07-20 PROCEDURE — 85025 COMPLETE CBC W/AUTO DIFF WBC: CPT

## 2020-07-20 PROCEDURE — 10002800 HB RX 250 W HCPCS: Performed by: RADIOLOGY

## 2020-07-20 PROCEDURE — 87077 CULTURE AEROBIC IDENTIFY: CPT

## 2020-07-20 PROCEDURE — 10002805 HB CONTRAST AGENT: Performed by: SURGERY

## 2020-07-20 PROCEDURE — 10002807 HB RX 258: Performed by: INTERNAL MEDICINE

## 2020-07-20 PROCEDURE — 10002801 HB RX 250 W/O HCPCS: Performed by: HOSPITALIST

## 2020-07-20 PROCEDURE — 82805 BLOOD GASES W/O2 SATURATION: CPT

## 2020-07-20 PROCEDURE — 47490 INCISION OF GALLBLADDER: CPT

## 2020-07-20 PROCEDURE — 80053 COMPREHEN METABOLIC PANEL: CPT

## 2020-07-20 PROCEDURE — 10002807 HB RX 258: Performed by: EMERGENCY MEDICINE

## 2020-07-20 PROCEDURE — 36415 COLL VENOUS BLD VENIPUNCTURE: CPT

## 2020-07-20 PROCEDURE — 10002800 HB RX 250 W HCPCS: Performed by: EMERGENCY MEDICINE

## 2020-07-20 PROCEDURE — 87075 CULTR BACTERIA EXCEPT BLOOD: CPT

## 2020-07-20 PROCEDURE — 10002801 HB RX 250 W/O HCPCS: Performed by: RADIOLOGY

## 2020-07-20 PROCEDURE — 10004281 HB COUNTER-STAFF TIME PER 15 MIN

## 2020-07-20 PROCEDURE — 87186 SC STD MICRODIL/AGAR DIL: CPT

## 2020-07-20 PROCEDURE — 10004651 HB RX, NO CHARGE ITEM: Performed by: HOSPITALIST

## 2020-07-20 PROCEDURE — 10002800 HB RX 250 W HCPCS: Performed by: HOSPITALIST

## 2020-07-20 RX ORDER — LIDOCAINE HYDROCHLORIDE 10 MG/ML
INJECTION, SOLUTION INFILTRATION; PERINEURAL PRN
Status: COMPLETED | OUTPATIENT
Start: 2020-07-20 | End: 2020-07-20

## 2020-07-20 RX ORDER — CEFAZOLIN SODIUM/WATER 1 G/10 ML
1000 SYRINGE (ML) INTRAVENOUS ONCE
Status: COMPLETED | OUTPATIENT
Start: 2020-07-20 | End: 2020-07-20

## 2020-07-20 RX ORDER — HEPARIN SODIUM 5000 [USP'U]/ML
5000 INJECTION, SOLUTION INTRAVENOUS; SUBCUTANEOUS EVERY 8 HOURS SCHEDULED
Status: DISCONTINUED | OUTPATIENT
Start: 2020-07-20 | End: 2020-07-24 | Stop reason: HOSPADM

## 2020-07-20 RX ORDER — OMEPRAZOLE 40 MG/1
40 CAPSULE, DELAYED RELEASE ORAL DAILY
COMMUNITY

## 2020-07-20 RX ORDER — GUAIFENESIN 400 MG/1
400 TABLET ORAL
COMMUNITY

## 2020-07-20 RX ORDER — LANOLIN ALCOHOL/MO/W.PET/CERES
3 CREAM (GRAM) TOPICAL NIGHTLY
COMMUNITY

## 2020-07-20 RX ORDER — CITALOPRAM HYDROBROMIDE 10 MG/1
10 TABLET ORAL DAILY
COMMUNITY

## 2020-07-20 RX ORDER — SODIUM CHLORIDE 9 MG/ML
INJECTION, SOLUTION INTRAVENOUS CONTINUOUS
Status: DISCONTINUED | OUTPATIENT
Start: 2020-07-20 | End: 2020-07-21

## 2020-07-20 RX ORDER — DONEPEZIL HYDROCHLORIDE 10 MG/1
10 TABLET, FILM COATED ORAL NIGHTLY
COMMUNITY

## 2020-07-20 RX ORDER — MEMANTINE HYDROCHLORIDE 10 MG/1
10 TABLET ORAL 2 TIMES DAILY
COMMUNITY

## 2020-07-20 RX ORDER — DONEPEZIL HYDROCHLORIDE 5 MG/1
10 TABLET, FILM COATED ORAL NIGHTLY
Status: DISCONTINUED | OUTPATIENT
Start: 2020-07-20 | End: 2020-07-24 | Stop reason: HOSPADM

## 2020-07-20 RX ORDER — FLUTICASONE PROPIONATE 50 MCG
2 SPRAY, SUSPENSION (ML) NASAL DAILY
COMMUNITY

## 2020-07-20 RX ORDER — CEFAZOLIN SODIUM/WATER 1 G/10 ML
1000 SYRINGE (ML) INTRAVENOUS EVERY 24 HOURS
Status: DISCONTINUED | OUTPATIENT
Start: 2020-07-20 | End: 2020-07-20

## 2020-07-20 RX ORDER — CITALOPRAM HYDROBROMIDE 10 MG/1
10 TABLET ORAL DAILY
Status: DISCONTINUED | OUTPATIENT
Start: 2020-07-21 | End: 2020-07-24 | Stop reason: HOSPADM

## 2020-07-20 RX ORDER — POLYETHYLENE GLYCOL 3350 17 G/17G
17 POWDER, FOR SOLUTION ORAL DAILY
Status: DISCONTINUED | OUTPATIENT
Start: 2020-07-20 | End: 2020-07-24 | Stop reason: HOSPADM

## 2020-07-20 RX ORDER — PANTOPRAZOLE SODIUM 40 MG/1
40 TABLET, DELAYED RELEASE ORAL
Status: DISCONTINUED | OUTPATIENT
Start: 2020-07-20 | End: 2020-07-24 | Stop reason: HOSPADM

## 2020-07-20 RX ORDER — MELATONIN
25 2 TIMES DAILY
COMMUNITY

## 2020-07-20 RX ORDER — MEMANTINE HYDROCHLORIDE 10 MG/1
10 TABLET ORAL 2 TIMES DAILY
Status: DISCONTINUED | OUTPATIENT
Start: 2020-07-20 | End: 2020-07-24 | Stop reason: HOSPADM

## 2020-07-20 RX ORDER — CEFAZOLIN SODIUM/WATER 2 G/20 ML
2000 SYRINGE (ML) INTRAVENOUS EVERY 24 HOURS
Status: DISCONTINUED | OUTPATIENT
Start: 2020-07-20 | End: 2020-07-20

## 2020-07-20 RX ORDER — 0.9 % SODIUM CHLORIDE 0.9 %
2 VIAL (ML) INJECTION EVERY 12 HOURS SCHEDULED
Status: DISCONTINUED | OUTPATIENT
Start: 2020-07-20 | End: 2020-07-24 | Stop reason: HOSPADM

## 2020-07-20 RX ORDER — TAMSULOSIN HYDROCHLORIDE 0.4 MG/1
0.4 CAPSULE ORAL DAILY
Status: DISCONTINUED | OUTPATIENT
Start: 2020-07-20 | End: 2020-07-24 | Stop reason: HOSPADM

## 2020-07-20 RX ORDER — ACETAMINOPHEN 650 MG/1
650 SUPPOSITORY RECTAL EVERY 4 HOURS PRN
Status: DISCONTINUED | OUTPATIENT
Start: 2020-07-20 | End: 2020-07-24 | Stop reason: HOSPADM

## 2020-07-20 RX ORDER — CITALOPRAM HYDROBROMIDE 20 MG/10ML
10 SOLUTION, ORAL ORAL DAILY
Status: DISCONTINUED | OUTPATIENT
Start: 2020-07-20 | End: 2020-07-20 | Stop reason: SDUPTHER

## 2020-07-20 RX ORDER — LEVOTHYROXINE SODIUM 0.07 MG/1
75 TABLET ORAL
COMMUNITY

## 2020-07-20 RX ORDER — ASPIRIN 325 MG
325 TABLET ORAL DAILY
COMMUNITY

## 2020-07-20 RX ORDER — LEVOTHYROXINE SODIUM 0.07 MG/1
75 TABLET ORAL DAILY
Status: DISCONTINUED | OUTPATIENT
Start: 2020-07-20 | End: 2020-07-24 | Stop reason: HOSPADM

## 2020-07-20 RX ADMIN — SODIUM CHLORIDE, PRESERVATIVE FREE 2 ML: 5 INJECTION INTRAVENOUS at 21:47

## 2020-07-20 RX ADMIN — HEPARIN SODIUM 5000 UNITS: 5000 INJECTION INTRAVENOUS; SUBCUTANEOUS at 16:23

## 2020-07-20 RX ADMIN — AZITHROMYCIN DIHYDRATE 500 MG: 500 INJECTION, POWDER, LYOPHILIZED, FOR SOLUTION INTRAVENOUS at 01:18

## 2020-07-20 RX ADMIN — SODIUM CHLORIDE: 0.9 INJECTION, SOLUTION INTRAVENOUS at 12:06

## 2020-07-20 RX ADMIN — CEFTRIAXONE SODIUM 1000 MG: 100 INJECTION, POWDER, FOR SOLUTION INTRAVENOUS at 01:14

## 2020-07-20 RX ADMIN — HEPARIN SODIUM 5000 UNITS: 5000 INJECTION INTRAVENOUS; SUBCUTANEOUS at 06:48

## 2020-07-20 RX ADMIN — METRONIDAZOLE 500 MG: 500 INJECTION, SOLUTION INTRAVENOUS at 06:45

## 2020-07-20 RX ADMIN — AZITHROMYCIN 500 MG: 500 INJECTION, POWDER, LYOPHILIZED, FOR SOLUTION INTRAVENOUS at 12:13

## 2020-07-20 RX ADMIN — IOHEXOL 5 ML: 350 INJECTION, SOLUTION INTRAVENOUS at 15:21

## 2020-07-20 RX ADMIN — SODIUM CHLORIDE, PRESERVATIVE FREE 2 ML: 5 INJECTION INTRAVENOUS at 12:16

## 2020-07-20 RX ADMIN — PIPERACILLIN SODIUM AND TAZOBACTAM SODIUM 3.38 G: 3; .375 INJECTION, POWDER, LYOPHILIZED, FOR SOLUTION INTRAVENOUS at 21:49

## 2020-07-20 RX ADMIN — PIPERACILLIN SODIUM AND TAZOBACTAM SODIUM 3.38 G: 3; .375 INJECTION, POWDER, LYOPHILIZED, FOR SOLUTION INTRAVENOUS at 16:25

## 2020-07-20 RX ADMIN — FENTANYL CITRATE 25 MCG: 50 INJECTION INTRAMUSCULAR; INTRAVENOUS at 15:11

## 2020-07-20 RX ADMIN — LIDOCAINE HYDROCHLORIDE 10 ML: 10 INJECTION, SOLUTION INFILTRATION; PERINEURAL at 15:10

## 2020-07-20 RX ADMIN — HEPARIN SODIUM 5000 UNITS: 5000 INJECTION INTRAVENOUS; SUBCUTANEOUS at 21:47

## 2020-07-20 ASSESSMENT — LIFESTYLE VARIABLES
AUDIT-C TOTAL SCORE: 0
ALCOHOL_USE_STATUS: NO OR LOW RISK WITH VALIDATED TOOL
HOW OFTEN DO YOU HAVE 6 OR MORE DRINKS ON ONE OCCASION: NEVER
HOW OFTEN DO YOU HAVE A DRINK CONTAINING ALCOHOL: NEVER
HOW MANY STANDARD DRINKS CONTAINING ALCOHOL DO YOU HAVE ON A TYPICAL DAY: 0,1 OR 2

## 2020-07-20 ASSESSMENT — PAIN SCALES - PAIN ASSESSMENT IN ADVANCED DEMENTIA (PAINAD)
TOTALSCORE: 0
FACIALEXPRESSION: SMILING OR INEXPRESSIVE
BREATHING: NORMAL
CONSOLABILITY: NO NEED TO CONSOLE
BODYLANGUAGE: RELAXED
BREATHING: NORMAL
BREATHING: NORMAL
CONSOLABILITY: NO NEED TO CONSOLE
FACIALEXPRESSION: SMILING OR INEXPRESSIVE
CONSOLABILITY: NO NEED TO CONSOLE
TOTALSCORE: 0
BODYLANGUAGE: RELAXED
TOTALSCORE: 0
BODYLANGUAGE: RELAXED
FACIALEXPRESSION: SMILING OR INEXPRESSIVE

## 2020-07-20 ASSESSMENT — ACTIVITIES OF DAILY LIVING (ADL)
CONTINENCE: DEPENDENT
CHRONIC_PAIN_PRESENT: NO
ADL_BEFORE_ADMISSION: NEEDS/REQUIRES ASSISTANCE
FEEDING YOURSELF: DEPENDENT
ADL_SHORT_OF_BREATH: NO
BATHING: DEPENDENT
TRANSFERRING: DEPENDENT
RECENT_DECLINE_ADL: YES, DECLINE IN AMBULATION/TRANSFERRING, COLLABORATE WITH PROVIDER (T)
ADL_SCORE: 0
DRESSING YOURSELF: DEPENDENT
TOILETING: DEPENDENT

## 2020-07-20 ASSESSMENT — COGNITIVE AND FUNCTIONAL STATUS - GENERAL
DO YOU HAVE DIFFICULTY DRESSING OR BATHING: YES
BECAUSE OF A PHYSICAL, MENTAL, OR EMOTIONAL CONDITION, DO YOU HAVE DIFFICULTY DOING ERRANDS ALONE: YES
ARE YOU DEAF OR DO YOU HAVE SERIOUS DIFFICULTY  HEARING: NO
BECAUSE OF A PHYSICAL, MENTAL, OR EMOTIONAL CONDITION, DO YOU HAVE SERIOUS DIFFICULTY CONCENTRATING, REMEMBERING OR MAKING DECISIONS: YES
ARE YOU BLIND OR DO YOU HAVE SERIOUS DIFFICULTY SEEING, EVEN WHEN WEARING GLASSES: NO
DO YOU HAVE SERIOUS DIFFICULTY WALKING OR CLIMBING STAIRS: YES

## 2020-07-21 LAB
ALBUMIN SERPL-MCNC: 2.6 G/DL (ref 3.6–5.1)
ALBUMIN/GLOB SERPL: 0.7 {RATIO} (ref 1–2.4)
ALP SERPL-CCNC: 48 UNITS/L (ref 45–117)
ALT SERPL-CCNC: 20 UNITS/L
ANION GAP SERPL CALC-SCNC: 9 MMOL/L (ref 10–20)
AST SERPL-CCNC: 16 UNITS/L
BASOPHILS # BLD: 0 K/MCL (ref 0–0.3)
BASOPHILS NFR BLD: 0 %
BILIRUB SERPL-MCNC: 0.8 MG/DL (ref 0.2–1)
BUN SERPL-MCNC: 25 MG/DL (ref 6–20)
BUN/CREAT SERPL: 27 (ref 7–25)
CALCIUM SERPL-MCNC: 8.3 MG/DL (ref 8.4–10.2)
CHLORIDE SERPL-SCNC: 104 MMOL/L (ref 98–107)
CO2 SERPL-SCNC: 30 MMOL/L (ref 21–32)
CREAT SERPL-MCNC: 0.92 MG/DL (ref 0.67–1.17)
DIFFERENTIAL METHOD BLD: ABNORMAL
EOSINOPHIL # BLD: 0.2 K/MCL (ref 0.1–0.5)
EOSINOPHIL NFR BLD: 2 %
ERYTHROCYTE [DISTWIDTH] IN BLOOD: 16.6 % (ref 11–15)
GLOBULIN SER-MCNC: 3.9 G/DL (ref 2–4)
GLUCOSE SERPL-MCNC: 89 MG/DL (ref 65–99)
HCT VFR BLD CALC: 32.6 % (ref 39–51)
HGB BLD-MCNC: 10.4 G/DL (ref 13–17)
IMM GRANULOCYTES # BLD AUTO: 0.1 K/MCL (ref 0–0.2)
IMM GRANULOCYTES NFR BLD: 1 %
LYMPHOCYTES # BLD: 0.8 K/MCL (ref 1–4)
LYMPHOCYTES NFR BLD: 10 %
MCH RBC QN AUTO: 31.3 PG (ref 26–34)
MCHC RBC AUTO-ENTMCNC: 31.9 G/DL (ref 32–36.5)
MCV RBC AUTO: 98.2 FL (ref 78–100)
MONOCYTES # BLD: 0.8 K/MCL (ref 0.3–0.9)
MONOCYTES NFR BLD: 11 %
NEUTROPHILS # BLD: 6.1 K/MCL (ref 1.8–7.7)
NEUTROPHILS NFR BLD: 76 %
NRBC BLD MANUAL-RTO: 0 /100 WBC
PLATELET # BLD: 172 K/MCL (ref 140–450)
POTASSIUM SERPL-SCNC: 4 MMOL/L (ref 3.4–5.1)
PROCALCITONIN SERPL IA-MCNC: 0.08 NG/ML
PROT SERPL-MCNC: 6.5 G/DL (ref 6.4–8.2)
RBC # BLD: 3.32 MIL/MCL (ref 4.5–5.9)
SODIUM SERPL-SCNC: 139 MMOL/L (ref 135–145)
WBC # BLD: 7.9 K/MCL (ref 4.2–11)

## 2020-07-21 PROCEDURE — 85025 COMPLETE CBC W/AUTO DIFF WBC: CPT

## 2020-07-21 PROCEDURE — 10003585 HB ROOM CHARGE INTERMEDIATE CARE

## 2020-07-21 PROCEDURE — 10004651 HB RX, NO CHARGE ITEM: Performed by: HOSPITALIST

## 2020-07-21 PROCEDURE — 36415 COLL VENOUS BLD VENIPUNCTURE: CPT

## 2020-07-21 PROCEDURE — 97530 THERAPEUTIC ACTIVITIES: CPT

## 2020-07-21 PROCEDURE — 10002803 HB RX 637: Performed by: INTERNAL MEDICINE

## 2020-07-21 PROCEDURE — 80053 COMPREHEN METABOLIC PANEL: CPT

## 2020-07-21 PROCEDURE — 10002800 HB RX 250 W HCPCS: Performed by: HOSPITALIST

## 2020-07-21 PROCEDURE — 84145 PROCALCITONIN (PCT): CPT

## 2020-07-21 PROCEDURE — 97162 PT EVAL MOD COMPLEX 30 MIN: CPT

## 2020-07-21 PROCEDURE — 10002800 HB RX 250 W HCPCS: Performed by: INTERNAL MEDICINE

## 2020-07-21 PROCEDURE — 92610 EVALUATE SWALLOWING FUNCTION: CPT

## 2020-07-21 PROCEDURE — 10002807 HB RX 258: Performed by: INTERNAL MEDICINE

## 2020-07-21 PROCEDURE — 97167 OT EVAL HIGH COMPLEX 60 MIN: CPT

## 2020-07-21 RX ADMIN — POLYETHYLENE GLYCOL 3350 17 G: 17 POWDER, FOR SOLUTION ORAL at 11:37

## 2020-07-21 RX ADMIN — AZITHROMYCIN 500 MG: 500 INJECTION, POWDER, LYOPHILIZED, FOR SOLUTION INTRAVENOUS at 11:24

## 2020-07-21 RX ADMIN — LEVOTHYROXINE SODIUM 75 MCG: 75 TABLET ORAL at 11:29

## 2020-07-21 RX ADMIN — MEMANTINE HYDROCHLORIDE 10 MG: 10 TABLET, FILM COATED ORAL at 21:45

## 2020-07-21 RX ADMIN — PIPERACILLIN SODIUM AND TAZOBACTAM SODIUM 3.38 G: 3; .375 INJECTION, POWDER, LYOPHILIZED, FOR SOLUTION INTRAVENOUS at 06:40

## 2020-07-21 RX ADMIN — SODIUM CHLORIDE, PRESERVATIVE FREE 2 ML: 5 INJECTION INTRAVENOUS at 21:45

## 2020-07-21 RX ADMIN — PIPERACILLIN SODIUM AND TAZOBACTAM SODIUM 3.38 G: 3; .375 INJECTION, POWDER, LYOPHILIZED, FOR SOLUTION INTRAVENOUS at 13:09

## 2020-07-21 RX ADMIN — MEMANTINE HYDROCHLORIDE 10 MG: 10 TABLET, FILM COATED ORAL at 11:28

## 2020-07-21 RX ADMIN — HEPARIN SODIUM 5000 UNITS: 5000 INJECTION INTRAVENOUS; SUBCUTANEOUS at 13:08

## 2020-07-21 RX ADMIN — HEPARIN SODIUM 5000 UNITS: 5000 INJECTION INTRAVENOUS; SUBCUTANEOUS at 06:42

## 2020-07-21 RX ADMIN — PIPERACILLIN SODIUM AND TAZOBACTAM SODIUM 3.38 G: 3; .375 INJECTION, POWDER, LYOPHILIZED, FOR SOLUTION INTRAVENOUS at 21:49

## 2020-07-21 RX ADMIN — HEPARIN SODIUM 5000 UNITS: 5000 INJECTION INTRAVENOUS; SUBCUTANEOUS at 21:45

## 2020-07-21 RX ADMIN — CITALOPRAM HYDROBROMIDE 10 MG: 10 TABLET ORAL at 11:30

## 2020-07-21 RX ADMIN — DONEPEZIL HYDROCHLORIDE 10 MG: 5 TABLET, FILM COATED ORAL at 21:45

## 2020-07-21 RX ADMIN — TAMSULOSIN HYDROCHLORIDE 0.4 MG: 0.4 CAPSULE ORAL at 11:30

## 2020-07-21 ASSESSMENT — PAIN SCALES - PAIN ASSESSMENT IN ADVANCED DEMENTIA (PAINAD)
TOTALSCORE: 0
TOTALSCORE: 0
BODYLANGUAGE: RELAXED
BODYLANGUAGE: RELAXED
FACIALEXPRESSION: SMILING OR INEXPRESSIVE
FACIALEXPRESSION: SMILING OR INEXPRESSIVE
BREATHING: NORMAL
CONSOLABILITY: NO NEED TO CONSOLE
CONSOLABILITY: NO NEED TO CONSOLE
BREATHING: NORMAL

## 2020-07-21 ASSESSMENT — COGNITIVE AND FUNCTIONAL STATUS - GENERAL
HELP NEEDED FOR BATHING: A LOT
HELP NEEDED FOR TOILETING: A LOT
REMEMBERING WHERE THINGS ARE: UNABLE
HELP NEEDED DRESSING REGULAR UPPER BODY CLOTHING: A LOT
REMEMBERING TO TAKE MEDICATION: UNABLE
APPLIED_COGNITIVE_RAW_SCORE: 7
DAILY_ACTIVITY_CONVERTED_SCORE: 30.60
HELP NEEDED DRESSING REGULAR LOWER BODY CLOTHING: A LOT
FOLLOWS FAMILIAR CONVERSATION: A LOT
BASIC_MOBILITY_CONVERTED_SCORE: 16.59
REMEMBERING 5 ERRANDS WITH NO LIST: UNABLE
DAILY_ACTIVITY_RAW_SCORE: 12
APPLIED_COGNITIVE_CONVERTED_SCORE: 15.17
HELP NEEDED FOR PERSONAL GROOMING: A LOT
BASIC_MOBILITY_RAW_SCORE: 6
UNDERSTANDING 10 TO 15 MIN SPEECH: UNABLE
TAKING CARE OF COMPLICATED TASKS: UNABLE

## 2020-07-21 ASSESSMENT — ACTIVITIES OF DAILY LIVING (ADL): EATING: MODERATE ASSIST (MOD)

## 2020-07-22 ENCOUNTER — APPOINTMENT (OUTPATIENT)
Dept: GENERAL RADIOLOGY | Age: 85
DRG: 177 | End: 2020-07-22
Attending: HOSPITALIST

## 2020-07-22 ENCOUNTER — CASE MANAGEMENT (OUTPATIENT)
Dept: CARE COORDINATION | Age: 85
End: 2020-07-22

## 2020-07-22 LAB
ALBUMIN SERPL-MCNC: 2.3 G/DL (ref 3.6–5.1)
ALBUMIN/GLOB SERPL: 0.6 {RATIO} (ref 1–2.4)
ALP SERPL-CCNC: 43 UNITS/L (ref 45–117)
ALT SERPL-CCNC: 18 UNITS/L
ANION GAP SERPL CALC-SCNC: 10 MMOL/L (ref 10–20)
AST SERPL-CCNC: 16 UNITS/L
BILIRUB SERPL-MCNC: 0.6 MG/DL (ref 0.2–1)
BUN SERPL-MCNC: 25 MG/DL (ref 6–20)
BUN/CREAT SERPL: 27 (ref 7–25)
CALCIUM SERPL-MCNC: 8.1 MG/DL (ref 8.4–10.2)
CHLORIDE SERPL-SCNC: 108 MMOL/L (ref 98–107)
CO2 SERPL-SCNC: 28 MMOL/L (ref 21–32)
CREAT SERPL-MCNC: 0.93 MG/DL (ref 0.67–1.17)
ERYTHROCYTE [DISTWIDTH] IN BLOOD: 16.2 % (ref 11–15)
GLOBULIN SER-MCNC: 3.9 G/DL (ref 2–4)
GLUCOSE SERPL-MCNC: 96 MG/DL (ref 65–99)
HCT VFR BLD CALC: 32.5 % (ref 39–51)
HGB BLD-MCNC: 10.4 G/DL (ref 13–17)
MCH RBC QN AUTO: 31.3 PG (ref 26–34)
MCHC RBC AUTO-ENTMCNC: 32 G/DL (ref 32–36.5)
MCV RBC AUTO: 97.9 FL (ref 78–100)
NRBC BLD MANUAL-RTO: 0 /100 WBC
PLATELET # BLD: 189 K/MCL (ref 140–450)
POTASSIUM SERPL-SCNC: 3.7 MMOL/L (ref 3.4–5.1)
PROT SERPL-MCNC: 6.2 G/DL (ref 6.4–8.2)
RBC # BLD: 3.32 MIL/MCL (ref 4.5–5.9)
SODIUM SERPL-SCNC: 142 MMOL/L (ref 135–145)
WBC # BLD: 6.7 K/MCL (ref 4.2–11)

## 2020-07-22 PROCEDURE — 10002803 HB RX 637: Performed by: INTERNAL MEDICINE

## 2020-07-22 PROCEDURE — 36415 COLL VENOUS BLD VENIPUNCTURE: CPT

## 2020-07-22 PROCEDURE — 74230 X-RAY XM SWLNG FUNCJ C+: CPT

## 2020-07-22 PROCEDURE — 92611 MOTION FLUOROSCOPY/SWALLOW: CPT | Performed by: SPEECH-LANGUAGE PATHOLOGIST

## 2020-07-22 PROCEDURE — 10004651 HB RX, NO CHARGE ITEM: Performed by: HOSPITALIST

## 2020-07-22 PROCEDURE — 10002803 HB RX 637: Performed by: HOSPITALIST

## 2020-07-22 PROCEDURE — 80053 COMPREHEN METABOLIC PANEL: CPT

## 2020-07-22 PROCEDURE — 92526 ORAL FUNCTION THERAPY: CPT | Performed by: SPEECH-LANGUAGE PATHOLOGIST

## 2020-07-22 PROCEDURE — 10002807 HB RX 258: Performed by: INTERNAL MEDICINE

## 2020-07-22 PROCEDURE — 10003585 HB ROOM CHARGE INTERMEDIATE CARE

## 2020-07-22 PROCEDURE — 10002800 HB RX 250 W HCPCS: Performed by: HOSPITALIST

## 2020-07-22 PROCEDURE — 10002800 HB RX 250 W HCPCS: Performed by: INTERNAL MEDICINE

## 2020-07-22 PROCEDURE — 85027 COMPLETE CBC AUTOMATED: CPT

## 2020-07-22 PROCEDURE — 97530 THERAPEUTIC ACTIVITIES: CPT

## 2020-07-22 RX ORDER — POTASSIUM CHLORIDE 20 MEQ/1
40 TABLET, EXTENDED RELEASE ORAL ONCE
Status: COMPLETED | OUTPATIENT
Start: 2020-07-22 | End: 2020-07-22

## 2020-07-22 RX ADMIN — PIPERACILLIN SODIUM AND TAZOBACTAM SODIUM 3.38 G: 3; .375 INJECTION, POWDER, LYOPHILIZED, FOR SOLUTION INTRAVENOUS at 15:36

## 2020-07-22 RX ADMIN — TAMSULOSIN HYDROCHLORIDE 0.4 MG: 0.4 CAPSULE ORAL at 10:06

## 2020-07-22 RX ADMIN — AZITHROMYCIN 500 MG: 500 INJECTION, POWDER, LYOPHILIZED, FOR SOLUTION INTRAVENOUS at 10:06

## 2020-07-22 RX ADMIN — POTASSIUM CHLORIDE 40 MEQ: 1500 TABLET, EXTENDED RELEASE ORAL at 13:20

## 2020-07-22 RX ADMIN — HEPARIN SODIUM 5000 UNITS: 5000 INJECTION INTRAVENOUS; SUBCUTANEOUS at 15:34

## 2020-07-22 RX ADMIN — PANTOPRAZOLE SODIUM 40 MG: 40 TABLET, DELAYED RELEASE ORAL at 05:47

## 2020-07-22 RX ADMIN — PIPERACILLIN SODIUM AND TAZOBACTAM SODIUM 3.38 G: 3; .375 INJECTION, POWDER, LYOPHILIZED, FOR SOLUTION INTRAVENOUS at 05:48

## 2020-07-22 RX ADMIN — DONEPEZIL HYDROCHLORIDE 10 MG: 5 TABLET, FILM COATED ORAL at 21:13

## 2020-07-22 RX ADMIN — MEMANTINE HYDROCHLORIDE 10 MG: 10 TABLET, FILM COATED ORAL at 10:06

## 2020-07-22 RX ADMIN — PIPERACILLIN SODIUM AND TAZOBACTAM SODIUM 3.38 G: 3; .375 INJECTION, POWDER, LYOPHILIZED, FOR SOLUTION INTRAVENOUS at 21:35

## 2020-07-22 RX ADMIN — MEMANTINE HYDROCHLORIDE 10 MG: 10 TABLET, FILM COATED ORAL at 21:13

## 2020-07-22 RX ADMIN — HEPARIN SODIUM 5000 UNITS: 5000 INJECTION INTRAVENOUS; SUBCUTANEOUS at 21:13

## 2020-07-22 RX ADMIN — CITALOPRAM HYDROBROMIDE 10 MG: 10 TABLET ORAL at 10:06

## 2020-07-22 RX ADMIN — HEPARIN SODIUM 5000 UNITS: 5000 INJECTION INTRAVENOUS; SUBCUTANEOUS at 05:46

## 2020-07-22 RX ADMIN — LEVOTHYROXINE SODIUM 75 MCG: 75 TABLET ORAL at 10:06

## 2020-07-22 RX ADMIN — SODIUM CHLORIDE, PRESERVATIVE FREE 2 ML: 5 INJECTION INTRAVENOUS at 21:14

## 2020-07-22 RX ADMIN — POLYETHYLENE GLYCOL 3350 17 G: 17 POWDER, FOR SOLUTION ORAL at 10:06

## 2020-07-22 ASSESSMENT — PAIN SCALES - PAIN ASSESSMENT IN ADVANCED DEMENTIA (PAINAD)
FACIALEXPRESSION: SMILING OR INEXPRESSIVE
BREATHING: NORMAL
TOTALSCORE: 0
CONSOLABILITY: NO NEED TO CONSOLE
TOTALSCORE: 0
BREATHING: NORMAL
TOTALSCORE: 0
BODYLANGUAGE: RELAXED
FACIALEXPRESSION: SMILING OR INEXPRESSIVE
BODYLANGUAGE: RELAXED
FACIALEXPRESSION: SMILING OR INEXPRESSIVE
CONSOLABILITY: NO NEED TO CONSOLE
BREATHING: NORMAL
BODYLANGUAGE: RELAXED
TOTALSCORE: 0
CONSOLABILITY: NO NEED TO CONSOLE
FACIALEXPRESSION: SMILING OR INEXPRESSIVE
BREATHING: NORMAL
BODYLANGUAGE: RELAXED
CONSOLABILITY: NO NEED TO CONSOLE

## 2020-07-22 ASSESSMENT — COGNITIVE AND FUNCTIONAL STATUS - GENERAL
BASIC_MOBILITY_RAW_SCORE: 6
BASIC_MOBILITY_CONVERTED_SCORE: 16.59

## 2020-07-23 PROCEDURE — 10003585 HB ROOM CHARGE INTERMEDIATE CARE

## 2020-07-23 PROCEDURE — 10002800 HB RX 250 W HCPCS: Performed by: INTERNAL MEDICINE

## 2020-07-23 PROCEDURE — 10002803 HB RX 637: Performed by: INTERNAL MEDICINE

## 2020-07-23 PROCEDURE — 97530 THERAPEUTIC ACTIVITIES: CPT

## 2020-07-23 PROCEDURE — 10002807 HB RX 258: Performed by: INTERNAL MEDICINE

## 2020-07-23 PROCEDURE — 92526 ORAL FUNCTION THERAPY: CPT

## 2020-07-23 PROCEDURE — 10002800 HB RX 250 W HCPCS: Performed by: HOSPITALIST

## 2020-07-23 PROCEDURE — 10004651 HB RX, NO CHARGE ITEM: Performed by: HOSPITALIST

## 2020-07-23 RX ADMIN — PIPERACILLIN SODIUM AND TAZOBACTAM SODIUM 3.38 G: 3; .375 INJECTION, POWDER, LYOPHILIZED, FOR SOLUTION INTRAVENOUS at 22:14

## 2020-07-23 RX ADMIN — TAMSULOSIN HYDROCHLORIDE 0.4 MG: 0.4 CAPSULE ORAL at 09:45

## 2020-07-23 RX ADMIN — HEPARIN SODIUM 5000 UNITS: 5000 INJECTION INTRAVENOUS; SUBCUTANEOUS at 15:48

## 2020-07-23 RX ADMIN — AZITHROMYCIN 500 MG: 500 INJECTION, POWDER, LYOPHILIZED, FOR SOLUTION INTRAVENOUS at 10:33

## 2020-07-23 RX ADMIN — MEMANTINE HYDROCHLORIDE 10 MG: 10 TABLET, FILM COATED ORAL at 22:01

## 2020-07-23 RX ADMIN — SODIUM CHLORIDE, PRESERVATIVE FREE 2 ML: 5 INJECTION INTRAVENOUS at 09:46

## 2020-07-23 RX ADMIN — PANTOPRAZOLE SODIUM 40 MG: 40 TABLET, DELAYED RELEASE ORAL at 05:31

## 2020-07-23 RX ADMIN — HEPARIN SODIUM 5000 UNITS: 5000 INJECTION INTRAVENOUS; SUBCUTANEOUS at 05:31

## 2020-07-23 RX ADMIN — MEMANTINE HYDROCHLORIDE 10 MG: 10 TABLET, FILM COATED ORAL at 09:46

## 2020-07-23 RX ADMIN — DONEPEZIL HYDROCHLORIDE 10 MG: 5 TABLET, FILM COATED ORAL at 22:01

## 2020-07-23 RX ADMIN — POLYETHYLENE GLYCOL 3350 17 G: 17 POWDER, FOR SOLUTION ORAL at 09:51

## 2020-07-23 RX ADMIN — PIPERACILLIN SODIUM AND TAZOBACTAM SODIUM 3.38 G: 3; .375 INJECTION, POWDER, LYOPHILIZED, FOR SOLUTION INTRAVENOUS at 14:40

## 2020-07-23 RX ADMIN — SODIUM CHLORIDE, PRESERVATIVE FREE 2 ML: 5 INJECTION INTRAVENOUS at 22:15

## 2020-07-23 RX ADMIN — HEPARIN SODIUM 5000 UNITS: 5000 INJECTION INTRAVENOUS; SUBCUTANEOUS at 22:01

## 2020-07-23 RX ADMIN — CITALOPRAM HYDROBROMIDE 10 MG: 10 TABLET ORAL at 09:46

## 2020-07-23 RX ADMIN — LEVOTHYROXINE SODIUM 75 MCG: 75 TABLET ORAL at 09:46

## 2020-07-23 RX ADMIN — PIPERACILLIN SODIUM AND TAZOBACTAM SODIUM 3.38 G: 3; .375 INJECTION, POWDER, LYOPHILIZED, FOR SOLUTION INTRAVENOUS at 05:31

## 2020-07-23 ASSESSMENT — PAIN SCALES - PAIN ASSESSMENT IN ADVANCED DEMENTIA (PAINAD)
BREATHING: NORMAL
BODYLANGUAGE: RELAXED
CONSOLABILITY: NO NEED TO CONSOLE
FACIALEXPRESSION: SMILING OR INEXPRESSIVE
TOTALSCORE: 0

## 2020-07-23 ASSESSMENT — COGNITIVE AND FUNCTIONAL STATUS - GENERAL
BASIC_MOBILITY_RAW_SCORE: 8
REMEMBERING WHERE THINGS ARE: UNABLE
REMEMBERING TO TAKE MEDICATION: UNABLE
APPLIED_COGNITIVE_RAW_SCORE: 6
REMEMBERING 5 ERRANDS WITH NO LIST: UNABLE
APPLIED_COGNITIVE_CONVERTED_SCORE: 7.69
FOLLOWS FAMILIAR CONVERSATION: UNABLE
UNDERSTANDING 10 TO 15 MIN SPEECH: UNABLE
BASIC_MOBILITY_CONVERTED_SCORE: 22.61
TAKING CARE OF COMPLICATED TASKS: UNABLE

## 2020-07-24 VITALS
HEIGHT: 68 IN | OXYGEN SATURATION: 95 % | DIASTOLIC BLOOD PRESSURE: 64 MMHG | TEMPERATURE: 97.2 F | RESPIRATION RATE: 20 BRPM | BODY MASS INDEX: 22.65 KG/M2 | WEIGHT: 149.47 LBS | SYSTOLIC BLOOD PRESSURE: 126 MMHG | HEART RATE: 52 BPM

## 2020-07-24 LAB
BACTERIA SPEC ANAEROBE+AEROBE CULT: ABNORMAL
BACTERIA SPEC ANAEROBE+AEROBE CULT: ABNORMAL
GRAM STN SPEC: ABNORMAL
GRAM STN SPEC: ABNORMAL
ORGANISM: ABNORMAL
REPORT STATUS (RPT): ABNORMAL
SPECIMEN SOURCE: ABNORMAL

## 2020-07-24 PROCEDURE — 92526 ORAL FUNCTION THERAPY: CPT

## 2020-07-24 PROCEDURE — 10002800 HB RX 250 W HCPCS: Performed by: HOSPITALIST

## 2020-07-24 PROCEDURE — 10002803 HB RX 637: Performed by: INTERNAL MEDICINE

## 2020-07-24 PROCEDURE — 10004651 HB RX, NO CHARGE ITEM: Performed by: HOSPITALIST

## 2020-07-24 PROCEDURE — 10002800 HB RX 250 W HCPCS: Performed by: INTERNAL MEDICINE

## 2020-07-24 PROCEDURE — 10002807 HB RX 258: Performed by: INTERNAL MEDICINE

## 2020-07-24 RX ORDER — AMOXICILLIN AND CLAVULANATE POTASSIUM 875; 125 MG/1; MG/1
1 TABLET, FILM COATED ORAL 2 TIMES DAILY
Qty: 20 TABLET | Refills: 0 | Status: SHIPPED | OUTPATIENT
Start: 2020-07-24 | End: 2020-08-03

## 2020-07-24 RX ADMIN — HEPARIN SODIUM 5000 UNITS: 5000 INJECTION INTRAVENOUS; SUBCUTANEOUS at 05:57

## 2020-07-24 RX ADMIN — SODIUM CHLORIDE, PRESERVATIVE FREE 2 ML: 5 INJECTION INTRAVENOUS at 09:03

## 2020-07-24 RX ADMIN — PANTOPRAZOLE SODIUM 40 MG: 40 TABLET, DELAYED RELEASE ORAL at 05:57

## 2020-07-24 RX ADMIN — CITALOPRAM HYDROBROMIDE 10 MG: 10 TABLET ORAL at 09:02

## 2020-07-24 RX ADMIN — MEMANTINE HYDROCHLORIDE 10 MG: 10 TABLET, FILM COATED ORAL at 09:03

## 2020-07-24 RX ADMIN — AZITHROMYCIN 500 MG: 500 INJECTION, POWDER, LYOPHILIZED, FOR SOLUTION INTRAVENOUS at 10:07

## 2020-07-24 RX ADMIN — PIPERACILLIN SODIUM AND TAZOBACTAM SODIUM 3.38 G: 3; .375 INJECTION, POWDER, LYOPHILIZED, FOR SOLUTION INTRAVENOUS at 05:57

## 2020-07-24 RX ADMIN — POLYETHYLENE GLYCOL 3350 17 G: 17 POWDER, FOR SOLUTION ORAL at 09:02

## 2020-07-24 RX ADMIN — LEVOTHYROXINE SODIUM 75 MCG: 75 TABLET ORAL at 09:03

## 2020-07-24 RX ADMIN — TAMSULOSIN HYDROCHLORIDE 0.4 MG: 0.4 CAPSULE ORAL at 09:03

## 2020-07-25 LAB
BACTERIA BLD CULT: NORMAL
BACTERIA BLD CULT: NORMAL
REPORT STATUS (RPT): NORMAL
REPORT STATUS (RPT): NORMAL
SPECIMEN SOURCE: NORMAL
SPECIMEN SOURCE: NORMAL

## 2020-07-27 ENCOUNTER — CASE MANAGEMENT (OUTPATIENT)
Dept: CARE COORDINATION | Age: 85
End: 2020-07-27

## 2020-08-14 ENCOUNTER — APPOINTMENT (OUTPATIENT)
Dept: GENERAL RADIOLOGY | Age: 85
DRG: 919 | End: 2020-08-14
Attending: EMERGENCY MEDICINE

## 2020-08-14 ENCOUNTER — HOSPITAL ENCOUNTER (INPATIENT)
Age: 85
LOS: 4 days | Discharge: HOME HEALTH | DRG: 919 | End: 2020-08-18
Attending: EMERGENCY MEDICINE | Admitting: HOSPITALIST

## 2020-08-14 ENCOUNTER — LAB SERVICES (OUTPATIENT)
Dept: LAB | Age: 85
End: 2020-08-14

## 2020-08-14 ENCOUNTER — TELEPHONE (OUTPATIENT)
Dept: PREADMISSION TESTING | Age: 85
End: 2020-08-14

## 2020-08-14 DIAGNOSIS — K81.0 ACUTE CHOLECYSTITIS: ICD-10-CM

## 2020-08-14 DIAGNOSIS — B34.9 VIRAL INFECTION: ICD-10-CM

## 2020-08-14 DIAGNOSIS — Z01.812 PRE-PROCEDURAL LABORATORY EXAMINATION: Primary | ICD-10-CM

## 2020-08-14 DIAGNOSIS — R50.9 FEVER, UNSPECIFIED FEVER CAUSE: Primary | ICD-10-CM

## 2020-08-14 DIAGNOSIS — Z01.812 PRE-PROCEDURAL LABORATORY EXAMINATION: ICD-10-CM

## 2020-08-14 DIAGNOSIS — R41.0 DISORIENTATION: ICD-10-CM

## 2020-08-14 LAB
ALBUMIN SERPL-MCNC: 3 G/DL (ref 3.6–5.1)
ALBUMIN/GLOB SERPL: 0.6 {RATIO} (ref 1–2.4)
ALP SERPL-CCNC: 240 UNITS/L (ref 45–117)
ALT SERPL-CCNC: 275 UNITS/L
AMYLASE SERPL-CCNC: 563 UNITS/L (ref 25–115)
ANION GAP SERPL CALC-SCNC: 9 MMOL/L (ref 10–20)
APPEARANCE UR: CLEAR
AST SERPL-CCNC: 409 UNITS/L
BACTERIA #/AREA URNS HPF: ABNORMAL /HPF
BASOPHILS # BLD: 0 K/MCL (ref 0–0.3)
BASOPHILS NFR BLD: 0 %
BILIRUB SERPL-MCNC: 2.5 MG/DL (ref 0.2–1)
BILIRUB UR QL STRIP: NEGATIVE
BUN SERPL-MCNC: 31 MG/DL (ref 6–20)
BUN/CREAT SERPL: 37 (ref 7–25)
CALCIUM SERPL-MCNC: 8.6 MG/DL (ref 8.4–10.2)
CHLORIDE SERPL-SCNC: 104 MMOL/L (ref 98–107)
CO2 SERPL-SCNC: 28 MMOL/L (ref 21–32)
COLOR UR: ABNORMAL
CREAT SERPL-MCNC: 0.83 MG/DL (ref 0.67–1.17)
DIFFERENTIAL METHOD BLD: ABNORMAL
EOSINOPHIL # BLD: 0.1 K/MCL (ref 0.1–0.5)
EOSINOPHIL NFR BLD: 1 %
ERYTHROCYTE [DISTWIDTH] IN BLOOD: 16.7 % (ref 11–15)
GLOBULIN SER-MCNC: 4.7 G/DL (ref 2–4)
GLUCOSE SERPL-MCNC: 125 MG/DL (ref 65–99)
GLUCOSE UR STRIP-MCNC: NEGATIVE MG/DL
HCT VFR BLD CALC: 39.7 % (ref 39–51)
HGB BLD-MCNC: 12.6 G/DL (ref 13–17)
HGB UR QL STRIP: NEGATIVE
HYALINE CASTS #/AREA URNS LPF: ABNORMAL /LPF (ref 0–5)
IMM GRANULOCYTES # BLD AUTO: 0.1 K/MCL (ref 0–0.2)
IMM GRANULOCYTES NFR BLD: 1 %
KETONES UR STRIP-MCNC: NEGATIVE MG/DL
LACTATE BLDV-MCNC: 1.8 MMOL/L
LEUKOCYTE ESTERASE UR QL STRIP: NEGATIVE
LIPASE SERPL-CCNC: 1947 UNITS/L (ref 73–393)
LYMPHOCYTES # BLD: 0.6 K/MCL (ref 1–4)
LYMPHOCYTES NFR BLD: 6 %
MCH RBC QN AUTO: 31.6 PG (ref 26–34)
MCHC RBC AUTO-ENTMCNC: 31.7 G/DL (ref 32–36.5)
MCV RBC AUTO: 99.5 FL (ref 78–100)
MONOCYTES # BLD: 0.9 K/MCL (ref 0.3–0.9)
MONOCYTES NFR BLD: 9 %
NEUTROPHILS # BLD: 7.9 K/MCL (ref 1.8–7.7)
NEUTROPHILS NFR BLD: 83 %
NITRITE UR QL STRIP: NEGATIVE
NRBC BLD MANUAL-RTO: 0 /100 WBC
NT-PROBNP SERPL-MCNC: 1283 PG/ML
PH UR STRIP: 6 UNITS (ref 5–7)
PLATELET # BLD: 281 K/MCL (ref 140–450)
POTASSIUM SERPL-SCNC: 4.6 MMOL/L (ref 3.4–5.1)
PROCALCITONIN SERPL IA-MCNC: 0.09 NG/ML
PROT SERPL-MCNC: 7.7 G/DL (ref 6.4–8.2)
PROT UR STRIP-MCNC: 30 MG/DL
RBC # BLD: 3.99 MIL/MCL (ref 4.5–5.9)
RBC #/AREA URNS HPF: ABNORMAL /HPF (ref 0–2)
SARS-COV-2 RNA RESP QL NAA+PROBE: NOT DETECTED
SERVICE CMNT-IMP: NORMAL
SODIUM SERPL-SCNC: 136 MMOL/L (ref 135–145)
SP GR UR STRIP: 1.02 (ref 1–1.03)
SPECIMEN SOURCE: ABNORMAL
SPECIMEN SOURCE: NORMAL
SQUAMOUS #/AREA URNS HPF: ABNORMAL /HPF (ref 0–5)
TROPONIN I SERPL HS-MCNC: <0.02 NG/ML
UROBILINOGEN UR STRIP-MCNC: 4 MG/DL (ref 0–1)
WBC # BLD: 9.5 K/MCL (ref 4.2–11)
WBC #/AREA URNS HPF: ABNORMAL /HPF (ref 0–5)

## 2020-08-14 PROCEDURE — 84484 ASSAY OF TROPONIN QUANT: CPT

## 2020-08-14 PROCEDURE — 93005 ELECTROCARDIOGRAM TRACING: CPT | Performed by: EMERGENCY MEDICINE

## 2020-08-14 PROCEDURE — 81001 URINALYSIS AUTO W/SCOPE: CPT

## 2020-08-14 PROCEDURE — 10002800 HB RX 250 W HCPCS: Performed by: EMERGENCY MEDICINE

## 2020-08-14 PROCEDURE — 10003585 HB ROOM CHARGE INTERMEDIATE CARE

## 2020-08-14 PROCEDURE — 10002803 HB RX 637: Performed by: EMERGENCY MEDICINE

## 2020-08-14 PROCEDURE — 10004281 HB COUNTER-STAFF TIME PER 15 MIN

## 2020-08-14 PROCEDURE — 10002807 HB RX 258: Performed by: EMERGENCY MEDICINE

## 2020-08-14 PROCEDURE — 83880 ASSAY OF NATRIURETIC PEPTIDE: CPT

## 2020-08-14 PROCEDURE — 74018 RADEX ABDOMEN 1 VIEW: CPT

## 2020-08-14 PROCEDURE — 96365 THER/PROPH/DIAG IV INF INIT: CPT

## 2020-08-14 PROCEDURE — 87040 BLOOD CULTURE FOR BACTERIA: CPT

## 2020-08-14 PROCEDURE — 71045 X-RAY EXAM CHEST 1 VIEW: CPT

## 2020-08-14 PROCEDURE — 87635 SARS-COV-2 COVID-19 AMP PRB: CPT

## 2020-08-14 PROCEDURE — 96361 HYDRATE IV INFUSION ADD-ON: CPT

## 2020-08-14 PROCEDURE — 85025 COMPLETE CBC W/AUTO DIFF WBC: CPT

## 2020-08-14 PROCEDURE — C9803 HOPD COVID-19 SPEC COLLECT: HCPCS

## 2020-08-14 PROCEDURE — 83690 ASSAY OF LIPASE: CPT

## 2020-08-14 PROCEDURE — 99285 EMERGENCY DEPT VISIT HI MDM: CPT

## 2020-08-14 PROCEDURE — 82150 ASSAY OF AMYLASE: CPT

## 2020-08-14 PROCEDURE — 80053 COMPREHEN METABOLIC PANEL: CPT

## 2020-08-14 PROCEDURE — 83605 ASSAY OF LACTIC ACID: CPT

## 2020-08-14 PROCEDURE — 84145 PROCALCITONIN (PCT): CPT

## 2020-08-14 RX ORDER — ACETAMINOPHEN 650 MG/1
650 SUPPOSITORY RECTAL ONCE
Status: COMPLETED | OUTPATIENT
Start: 2020-08-14 | End: 2020-08-14

## 2020-08-14 RX ADMIN — ACETAMINOPHEN 650 MG: 650 SUPPOSITORY RECTAL at 21:50

## 2020-08-14 RX ADMIN — PIPERACILLIN SODIUM AND TAZOBACTAM SODIUM 4.5 G: 4; .5 INJECTION, POWDER, LYOPHILIZED, FOR SOLUTION INTRAVENOUS at 21:49

## 2020-08-14 RX ADMIN — ACETAMINOPHEN 325 MG: 325 SUPPOSITORY RECTAL at 21:50

## 2020-08-14 RX ADMIN — SODIUM CHLORIDE, POTASSIUM CHLORIDE, SODIUM LACTATE AND CALCIUM CHLORIDE 1000 ML: 600; 310; 30; 20 INJECTION, SOLUTION INTRAVENOUS at 20:55

## 2020-08-14 ASSESSMENT — ACTIVITIES OF DAILY LIVING (ADL)
FEEDING: DEPENDENT
CONTINENCE: DEPENDENT
ADL_SCORE: 0
DRESSING: DEPENDENT
BATHING: DEPENDENT
MOBILITY_ASSIST_DEVICES: WHEELCHAIR
TRANSFERRING: DEPENDENT
ADL_BEFORE_ADMISSION: NEEDS/REQUIRES ASSISTANCE
TOILETING: DEPENDENT

## 2020-08-15 ENCOUNTER — APPOINTMENT (OUTPATIENT)
Dept: CT IMAGING | Age: 85
DRG: 919 | End: 2020-08-15
Attending: RADIOLOGY

## 2020-08-15 LAB
ALBUMIN SERPL-MCNC: 2.5 G/DL (ref 3.6–5.1)
ALBUMIN/GLOB SERPL: 0.6 {RATIO} (ref 1–2.4)
ALP SERPL-CCNC: 184 UNITS/L (ref 45–117)
ALT SERPL-CCNC: 261 UNITS/L
ANION GAP SERPL CALC-SCNC: 6 MMOL/L (ref 10–20)
AST SERPL-CCNC: 304 UNITS/L
ATRIAL RATE (BPM): 82
BILIRUB SERPL-MCNC: 3.1 MG/DL (ref 0.2–1)
BUN SERPL-MCNC: 26 MG/DL (ref 6–20)
BUN/CREAT SERPL: 33 (ref 7–25)
CALCIUM SERPL-MCNC: 8.2 MG/DL (ref 8.4–10.2)
CHLORIDE SERPL-SCNC: 107 MMOL/L (ref 98–107)
CO2 SERPL-SCNC: 31 MMOL/L (ref 21–32)
CREAT SERPL-MCNC: 0.78 MG/DL (ref 0.67–1.17)
ERYTHROCYTE [DISTWIDTH] IN BLOOD: 16.8 % (ref 11–15)
GLOBULIN SER-MCNC: 3.9 G/DL (ref 2–4)
GLUCOSE BLDC GLUCOMTR-MCNC: 108 MG/DL (ref 70–99)
GLUCOSE BLDC GLUCOMTR-MCNC: 121 MG/DL (ref 70–99)
GLUCOSE SERPL-MCNC: 110 MG/DL (ref 65–99)
HCT VFR BLD CALC: 34.3 % (ref 39–51)
HGB BLD-MCNC: 11 G/DL (ref 13–17)
MAGNESIUM SERPL-MCNC: 2 MG/DL (ref 1.7–2.4)
MCH RBC QN AUTO: 31.7 PG (ref 26–34)
MCHC RBC AUTO-ENTMCNC: 32.1 G/DL (ref 32–36.5)
MCV RBC AUTO: 98.8 FL (ref 78–100)
NRBC BLD MANUAL-RTO: 0 /100 WBC
PLATELET # BLD: 243 K/MCL (ref 140–450)
POTASSIUM SERPL-SCNC: 4 MMOL/L (ref 3.4–5.1)
PR-INTERVAL (MSEC): 342
PROT SERPL-MCNC: 6.4 G/DL (ref 6.4–8.2)
QRS-INTERVAL (MSEC): 120
QT-INTERVAL (MSEC): 420
QTC: 490
R AXIS (DEGREES): -28
RBC # BLD: 3.47 MIL/MCL (ref 4.5–5.9)
REPORT TEXT: NORMAL
SARS-COV-2 RNA RESP QL NAA+PROBE: NOT DETECTED
SERVICE CMNT-IMP: NORMAL
SODIUM SERPL-SCNC: 140 MMOL/L (ref 135–145)
SPECIMEN SOURCE: NORMAL
T AXIS (DEGREES): -53
VENTRICULAR RATE EKG/MIN (BPM): 82
WBC # BLD: 9.9 K/MCL (ref 4.2–11)

## 2020-08-15 PROCEDURE — 85027 COMPLETE CBC AUTOMATED: CPT

## 2020-08-15 PROCEDURE — 10002807 HB RX 258: Performed by: HOSPITALIST

## 2020-08-15 PROCEDURE — 74176 CT ABD & PELVIS W/O CONTRAST: CPT

## 2020-08-15 PROCEDURE — 3C1ZX8Z IRRIGATION OF INDWELLING DEVICE USING IRRIGATING SUBSTANCE, EXTERNAL APPROACH: ICD-10-PCS | Performed by: RADIOLOGY

## 2020-08-15 PROCEDURE — 36415 COLL VENOUS BLD VENIPUNCTURE: CPT

## 2020-08-15 PROCEDURE — 80053 COMPREHEN METABOLIC PANEL: CPT

## 2020-08-15 PROCEDURE — 10006031 HB ROOM CHARGE TELEMETRY

## 2020-08-15 PROCEDURE — 83735 ASSAY OF MAGNESIUM: CPT

## 2020-08-15 PROCEDURE — 10002800 HB RX 250 W HCPCS: Performed by: HOSPITALIST

## 2020-08-15 RX ORDER — AMOXICILLIN 250 MG
1 CAPSULE ORAL DAILY
COMMUNITY

## 2020-08-15 RX ORDER — ASPIRIN 325 MG
325 TABLET ORAL DAILY
Status: DISCONTINUED | OUTPATIENT
Start: 2020-08-15 | End: 2020-08-18 | Stop reason: HOSPADM

## 2020-08-15 RX ORDER — POTASSIUM CHLORIDE 20 MEQ/1
40 TABLET, EXTENDED RELEASE ORAL EVERY 4 HOURS PRN
Status: DISCONTINUED | OUTPATIENT
Start: 2020-08-15 | End: 2020-08-18 | Stop reason: HOSPADM

## 2020-08-15 RX ORDER — POTASSIUM CHLORIDE 20 MEQ/1
20 TABLET, EXTENDED RELEASE ORAL EVERY 4 HOURS PRN
Status: DISCONTINUED | OUTPATIENT
Start: 2020-08-15 | End: 2020-08-18 | Stop reason: HOSPADM

## 2020-08-15 RX ORDER — POTASSIUM CHLORIDE 14.9 MG/ML
20 INJECTION INTRAVENOUS EVERY 4 HOURS PRN
Status: DISCONTINUED | OUTPATIENT
Start: 2020-08-15 | End: 2020-08-18 | Stop reason: HOSPADM

## 2020-08-15 RX ORDER — LEVOTHYROXINE SODIUM 0.07 MG/1
75 TABLET ORAL
Status: DISCONTINUED | OUTPATIENT
Start: 2020-08-15 | End: 2020-08-18 | Stop reason: HOSPADM

## 2020-08-15 RX ORDER — MEMANTINE HYDROCHLORIDE 10 MG/1
10 TABLET ORAL 2 TIMES DAILY
Status: DISCONTINUED | OUTPATIENT
Start: 2020-08-15 | End: 2020-08-18 | Stop reason: HOSPADM

## 2020-08-15 RX ORDER — ACETAMINOPHEN 325 MG/1
650 TABLET ORAL EVERY 4 HOURS PRN
Status: DISCONTINUED | OUTPATIENT
Start: 2020-08-15 | End: 2020-08-18 | Stop reason: HOSPADM

## 2020-08-15 RX ORDER — DONEPEZIL HYDROCHLORIDE 10 MG/1
10 TABLET, FILM COATED ORAL NIGHTLY
Status: DISCONTINUED | OUTPATIENT
Start: 2020-08-15 | End: 2020-08-18 | Stop reason: HOSPADM

## 2020-08-15 RX ORDER — POTASSIUM CHLORIDE 1.5 G/1.58G
40 POWDER, FOR SOLUTION ORAL EVERY 4 HOURS PRN
Status: DISCONTINUED | OUTPATIENT
Start: 2020-08-15 | End: 2020-08-18 | Stop reason: HOSPADM

## 2020-08-15 RX ORDER — SODIUM CHLORIDE 9 MG/ML
100 INJECTION, SOLUTION INTRAVENOUS CONTINUOUS
Status: DISCONTINUED | OUTPATIENT
Start: 2020-08-15 | End: 2020-08-15

## 2020-08-15 RX ORDER — MAGNESIUM SULFATE 1 G/100ML
1 INJECTION INTRAVENOUS DAILY PRN
Status: DISCONTINUED | OUTPATIENT
Start: 2020-08-15 | End: 2020-08-18 | Stop reason: HOSPADM

## 2020-08-15 RX ORDER — ONDANSETRON 2 MG/ML
4 INJECTION INTRAMUSCULAR; INTRAVENOUS EVERY 6 HOURS PRN
Status: DISCONTINUED | OUTPATIENT
Start: 2020-08-15 | End: 2020-08-18 | Stop reason: HOSPADM

## 2020-08-15 RX ORDER — PANTOPRAZOLE SODIUM 40 MG/1
40 TABLET, DELAYED RELEASE ORAL
Status: DISCONTINUED | OUTPATIENT
Start: 2020-08-15 | End: 2020-08-16

## 2020-08-15 RX ORDER — LANOLIN ALCOHOL/MO/W.PET/CERES
3 CREAM (GRAM) TOPICAL NIGHTLY
Status: DISCONTINUED | OUTPATIENT
Start: 2020-08-15 | End: 2020-08-18 | Stop reason: HOSPADM

## 2020-08-15 RX ORDER — TAMSULOSIN HYDROCHLORIDE 0.4 MG/1
0.4 CAPSULE ORAL DAILY
Status: DISCONTINUED | OUTPATIENT
Start: 2020-08-15 | End: 2020-08-18 | Stop reason: HOSPADM

## 2020-08-15 RX ORDER — ENOXAPARIN SODIUM 100 MG/ML
40 INJECTION SUBCUTANEOUS DAILY
Status: DISCONTINUED | OUTPATIENT
Start: 2020-08-15 | End: 2020-08-18 | Stop reason: HOSPADM

## 2020-08-15 RX ORDER — POLYETHYLENE GLYCOL 3350 17 G/17G
17 POWDER, FOR SOLUTION ORAL DAILY
COMMUNITY

## 2020-08-15 RX ORDER — POTASSIUM CHLORIDE 1.5 G/1.58G
20 POWDER, FOR SOLUTION ORAL EVERY 4 HOURS PRN
Status: DISCONTINUED | OUTPATIENT
Start: 2020-08-15 | End: 2020-08-18 | Stop reason: HOSPADM

## 2020-08-15 RX ORDER — CITALOPRAM 20 MG/1
10 TABLET ORAL DAILY
Status: DISCONTINUED | OUTPATIENT
Start: 2020-08-15 | End: 2020-08-18 | Stop reason: HOSPADM

## 2020-08-15 RX ADMIN — PIPERACILLIN AND TAZOBACTAM 3.38 G: 3; .375 INJECTION, POWDER, FOR SOLUTION INTRAVENOUS at 05:48

## 2020-08-15 RX ADMIN — PIPERACILLIN AND TAZOBACTAM 3.38 G: 3; .375 INJECTION, POWDER, FOR SOLUTION INTRAVENOUS at 21:24

## 2020-08-15 RX ADMIN — SODIUM CHLORIDE 25 ML: 0.9 INJECTION, SOLUTION INTRAVENOUS at 21:24

## 2020-08-15 RX ADMIN — PIPERACILLIN AND TAZOBACTAM 3.38 G: 3; .375 INJECTION, POWDER, FOR SOLUTION INTRAVENOUS at 13:56

## 2020-08-15 RX ADMIN — SODIUM CHLORIDE 100 ML/HR: 0.9 INJECTION, SOLUTION INTRAVENOUS at 17:52

## 2020-08-15 ASSESSMENT — UNIFIED PARKINSONS DISEASE RATING SCALE (UPDRS)
MOST_AFFECTED_SIDE: EQUALLY AFFECTED
TREMOR_ATREST_SCALE: SLIGHT OR INFREQUENT
MOST_AFFECTED_SIDE: EQUALLY AFFECTED
BRADYKINESIA_SCALE: MODERATE DEGREE OF SLOWNESS AND POVERTY OF MOVEMENT WHICH IS DEFINITELY ABNORMAL
TREMOR_ATREST_SCALE: MODERATE AND PRESENT MOST OF THE TIME
RIGIDITY_SCALE: ABSENT
BRADYKINESIA_SCALE: MODERATE DEGREE OF SLOWNESS AND POVERTY OF MOVEMENT WHICH IS DEFINITELY ABNORMAL
RIGIDITY_SCALE: ABSENT
TREMOR_ATREST_SCALE: MODERATE IN AMPLITUDE, BUT MOSTLY INTERMITTENTLY PRESENT
MOST_AFFECTED_SIDE: EQUALLY AFFECTED
RIGIDITY_SCALE: ABSENT
MOST_AFFECTED_SIDE: EQUALLY AFFECTED
TREMOR_ATREST_SCALE: MARKED IN AMPLITUDE AND PRESENT MOST OF THE TIME
RIGIDITY_SCALE: MODERATE
BRADYKINESIA_SCALE: MODERATE DEGREE OF SLOWNESS AND POVERTY OF MOVEMENT WHICH IS DEFINITELY ABNORMAL

## 2020-08-15 ASSESSMENT — PAIN SCALES - PAIN ASSESSMENT IN ADVANCED DEMENTIA (PAINAD)
BODYLANGUAGE: RELAXED
CONSOLABILITY: NO NEED TO CONSOLE
FACIALEXPRESSION: SMILING OR INEXPRESSIVE
BREATHING: OCCASIONAL LABORED BREATHING, SHORT PERIOD OF HYPERVENTILATION
CONSOLABILITY: NO NEED TO CONSOLE
TOTALSCORE: 1
FACIALEXPRESSION: SMILING OR INEXPRESSIVE
BREATHING: OCCASIONAL LABORED BREATHING, SHORT PERIOD OF HYPERVENTILATION
TOTALSCORE: 1
BODYLANGUAGE: RELAXED
CONSOLABILITY: NO NEED TO CONSOLE
BODYLANGUAGE: TENSE, DISTRESSED, FIDGETING
CONSOLABILITY: NO NEED TO CONSOLE
BREATHING: NORMAL
FACIALEXPRESSION: SMILING OR INEXPRESSIVE
TOTALSCORE: 1
TOTALSCORE: 2
BREATHING: OCCASIONAL LABORED BREATHING, SHORT PERIOD OF HYPERVENTILATION
FACIALEXPRESSION: SMILING OR INEXPRESSIVE
BODYLANGUAGE: TENSE, DISTRESSED, FIDGETING

## 2020-08-15 ASSESSMENT — ACTIVITIES OF DAILY LIVING (ADL)
RECENT_DECLINE_ADL: NO
ADL_SCORE: 0
TOILETING: DEPENDENT
ADL_BEFORE_ADMISSION: NEEDS/REQUIRES ASSISTANCE
FEEDING YOURSELF: DEPENDENT
BATHING: DEPENDENT
MOBILITY_ASSIST_DEVICES: FOUR WHEEL WALKER
DRESSING YOURSELF: DEPENDENT
CONTINENCE: DEPENDENT
ADL_SHORT_OF_BREATH: NO
CHRONIC_PAIN_PRESENT: UNABLE TO ASSESS
TRANSFERRING: DEPENDENT

## 2020-08-15 ASSESSMENT — COGNITIVE AND FUNCTIONAL STATUS - GENERAL
BECAUSE OF A PHYSICAL, MENTAL, OR EMOTIONAL CONDITION, DO YOU HAVE DIFFICULTY DOING ERRANDS ALONE: YES
ARE YOU BLIND OR DO YOU HAVE SERIOUS DIFFICULTY SEEING, EVEN WHEN WEARING GLASSES: NO
DO YOU HAVE DIFFICULTY DRESSING OR BATHING: YES
DO YOU HAVE SERIOUS DIFFICULTY WALKING OR CLIMBING STAIRS: YES
ARE YOU DEAF OR DO YOU HAVE SERIOUS DIFFICULTY  HEARING: NO
BECAUSE OF A PHYSICAL, MENTAL, OR EMOTIONAL CONDITION, DO YOU HAVE SERIOUS DIFFICULTY CONCENTRATING, REMEMBERING OR MAKING DECISIONS: YES

## 2020-08-16 ENCOUNTER — APPOINTMENT (OUTPATIENT)
Dept: GENERAL RADIOLOGY | Age: 85
DRG: 919 | End: 2020-08-16
Attending: HOSPITALIST

## 2020-08-16 LAB
ALBUMIN SERPL-MCNC: 2.4 G/DL (ref 3.6–5.1)
ALBUMIN/GLOB SERPL: 0.6 {RATIO} (ref 1–2.4)
ALP SERPL-CCNC: 160 UNITS/L (ref 45–117)
ALT SERPL-CCNC: 204 UNITS/L
ANION GAP SERPL CALC-SCNC: 7 MMOL/L (ref 10–20)
AST SERPL-CCNC: 154 UNITS/L
BILIRUB SERPL-MCNC: 1.8 MG/DL (ref 0.2–1)
BUN SERPL-MCNC: 27 MG/DL (ref 6–20)
BUN/CREAT SERPL: 31 (ref 7–25)
CALCIUM SERPL-MCNC: 8.7 MG/DL (ref 8.4–10.2)
CHLORIDE SERPL-SCNC: 109 MMOL/L (ref 98–107)
CO2 SERPL-SCNC: 31 MMOL/L (ref 21–32)
CREAT SERPL-MCNC: 0.88 MG/DL (ref 0.67–1.17)
ERYTHROCYTE [DISTWIDTH] IN BLOOD: 17.2 % (ref 11–15)
GLOBULIN SER-MCNC: 4.1 G/DL (ref 2–4)
GLUCOSE BLDC GLUCOMTR-MCNC: 81 MG/DL (ref 70–99)
GLUCOSE BLDC GLUCOMTR-MCNC: 83 MG/DL (ref 70–99)
GLUCOSE SERPL-MCNC: 90 MG/DL (ref 65–99)
HCT VFR BLD CALC: 32.1 % (ref 39–51)
HGB BLD-MCNC: 10.1 G/DL (ref 13–17)
MAGNESIUM SERPL-MCNC: 2.2 MG/DL (ref 1.7–2.4)
MCH RBC QN AUTO: 31.5 PG (ref 26–34)
MCHC RBC AUTO-ENTMCNC: 31.5 G/DL (ref 32–36.5)
MCV RBC AUTO: 100 FL (ref 78–100)
NRBC BLD MANUAL-RTO: 0 /100 WBC
PLATELET # BLD: 207 K/MCL (ref 140–450)
POTASSIUM SERPL-SCNC: 4 MMOL/L (ref 3.4–5.1)
PROT SERPL-MCNC: 6.5 G/DL (ref 6.4–8.2)
RBC # BLD: 3.21 MIL/MCL (ref 4.5–5.9)
SODIUM SERPL-SCNC: 143 MMOL/L (ref 135–145)
WBC # BLD: 8.2 K/MCL (ref 4.2–11)

## 2020-08-16 PROCEDURE — 92610 EVALUATE SWALLOWING FUNCTION: CPT

## 2020-08-16 PROCEDURE — C9113 INJ PANTOPRAZOLE SODIUM, VIA: HCPCS | Performed by: HOSPITALIST

## 2020-08-16 PROCEDURE — 85027 COMPLETE CBC AUTOMATED: CPT

## 2020-08-16 PROCEDURE — 10002800 HB RX 250 W HCPCS: Performed by: HOSPITALIST

## 2020-08-16 PROCEDURE — 36415 COLL VENOUS BLD VENIPUNCTURE: CPT

## 2020-08-16 PROCEDURE — 71045 X-RAY EXAM CHEST 1 VIEW: CPT

## 2020-08-16 PROCEDURE — 10006031 HB ROOM CHARGE TELEMETRY

## 2020-08-16 PROCEDURE — 10002807 HB RX 258: Performed by: HOSPITALIST

## 2020-08-16 PROCEDURE — 83735 ASSAY OF MAGNESIUM: CPT

## 2020-08-16 PROCEDURE — 80053 COMPREHEN METABOLIC PANEL: CPT

## 2020-08-16 PROCEDURE — 10002803 HB RX 637: Performed by: HOSPITALIST

## 2020-08-16 RX ORDER — BISACODYL 10 MG
10 SUPPOSITORY, RECTAL RECTAL ONCE
Status: COMPLETED | OUTPATIENT
Start: 2020-08-16 | End: 2020-08-16

## 2020-08-16 RX ORDER — PANTOPRAZOLE SODIUM 40 MG/10ML
40 INJECTION, POWDER, LYOPHILIZED, FOR SOLUTION INTRAVENOUS DAILY
Status: DISCONTINUED | OUTPATIENT
Start: 2020-08-16 | End: 2020-08-18 | Stop reason: HOSPADM

## 2020-08-16 RX ORDER — FUROSEMIDE 10 MG/ML
20 INJECTION INTRAMUSCULAR; INTRAVENOUS ONCE
Status: COMPLETED | OUTPATIENT
Start: 2020-08-16 | End: 2020-08-16

## 2020-08-16 RX ADMIN — FUROSEMIDE 20 MG: 10 INJECTION, SOLUTION INTRAVENOUS at 18:07

## 2020-08-16 RX ADMIN — PIPERACILLIN AND TAZOBACTAM 3.38 G: 3; .375 INJECTION, POWDER, FOR SOLUTION INTRAVENOUS at 13:34

## 2020-08-16 RX ADMIN — BISACODYL 10 MG: 10 SUPPOSITORY RECTAL at 11:49

## 2020-08-16 RX ADMIN — ENOXAPARIN SODIUM 40 MG: 40 INJECTION SUBCUTANEOUS at 11:39

## 2020-08-16 RX ADMIN — PIPERACILLIN AND TAZOBACTAM 3.38 G: 3; .375 INJECTION, POWDER, FOR SOLUTION INTRAVENOUS at 05:00

## 2020-08-16 RX ADMIN — PANTOPRAZOLE SODIUM 40 MG: 40 INJECTION, POWDER, FOR SOLUTION INTRAVENOUS at 11:41

## 2020-08-16 RX ADMIN — PIPERACILLIN AND TAZOBACTAM 3.38 G: 3; .375 INJECTION, POWDER, FOR SOLUTION INTRAVENOUS at 22:47

## 2020-08-16 ASSESSMENT — COGNITIVE AND FUNCTIONAL STATUS - GENERAL
REMEMBERING WHERE THINGS ARE: UNABLE
REMEMBERING TO TAKE MEDICATION: UNABLE
FOLLOWS FAMILIAR CONVERSATION: A LOT
TAKING CARE OF COMPLICATED TASKS: UNABLE
REMEMBERING 5 ERRANDS WITH NO LIST: UNABLE
APPLIED_COGNITIVE_RAW_SCORE: 7
APPLIED_COGNITIVE_CONVERTED_SCORE: 15.17
UNDERSTANDING 10 TO 15 MIN SPEECH: UNABLE

## 2020-08-16 ASSESSMENT — PAIN SCALES - PAIN ASSESSMENT IN ADVANCED DEMENTIA (PAINAD)
BREATHING: OCCASIONAL LABORED BREATHING, SHORT PERIOD OF HYPERVENTILATION
TOTALSCORE: 2
FACIALEXPRESSION: SMILING OR INEXPRESSIVE
BODYLANGUAGE: TENSE, DISTRESSED, FIDGETING
CONSOLABILITY: NO NEED TO CONSOLE

## 2020-08-16 ASSESSMENT — UNIFIED PARKINSONS DISEASE RATING SCALE (UPDRS)
MOST_AFFECTED_SIDE: EQUALLY AFFECTED
RIGIDITY_SCALE: MODERATE
TREMOR_ATREST_SCALE: MARKED IN AMPLITUDE AND PRESENT MOST OF THE TIME

## 2020-08-16 ASSESSMENT — PAIN SCALES - WONG BAKER: WONGBAKER_NUMERICALRESPONSE: 0

## 2020-08-17 ENCOUNTER — HOSPITAL ENCOUNTER (INPATIENT)
Dept: INTERVENTIONAL RADIOLOGY/VASCULAR | Age: 85
DRG: 919 | End: 2020-08-17
Attending: SURGERY

## 2020-08-17 ENCOUNTER — APPOINTMENT (OUTPATIENT)
Dept: INTERVENTIONAL RADIOLOGY/VASCULAR | Age: 85
DRG: 919 | End: 2020-08-17
Attending: SURGERY

## 2020-08-17 ENCOUNTER — APPOINTMENT (OUTPATIENT)
Dept: INTERVENTIONAL RADIOLOGY/VASCULAR | Age: 85
End: 2020-08-17
Attending: SURGERY

## 2020-08-17 LAB
ALBUMIN SERPL-MCNC: 2.5 G/DL (ref 3.6–5.1)
ALBUMIN/GLOB SERPL: 0.6 {RATIO} (ref 1–2.4)
ALP SERPL-CCNC: 134 UNITS/L (ref 45–117)
ALT SERPL-CCNC: 155 UNITS/L
ANION GAP SERPL CALC-SCNC: 12 MMOL/L (ref 10–20)
AST SERPL-CCNC: 87 UNITS/L
BILIRUB SERPL-MCNC: 1.2 MG/DL (ref 0.2–1)
BUN SERPL-MCNC: 32 MG/DL (ref 6–20)
BUN/CREAT SERPL: 37 (ref 7–25)
CALCIUM SERPL-MCNC: 8.4 MG/DL (ref 8.4–10.2)
CHLORIDE SERPL-SCNC: 111 MMOL/L (ref 98–107)
CO2 SERPL-SCNC: 26 MMOL/L (ref 21–32)
CREAT SERPL-MCNC: 0.87 MG/DL (ref 0.67–1.17)
ERYTHROCYTE [DISTWIDTH] IN BLOOD: 16.9 % (ref 11–15)
GLOBULIN SER-MCNC: 4.2 G/DL (ref 2–4)
GLUCOSE SERPL-MCNC: 74 MG/DL (ref 65–99)
HCT VFR BLD CALC: 33.1 % (ref 39–51)
HGB BLD-MCNC: 10.4 G/DL (ref 13–17)
MCH RBC QN AUTO: 31.7 PG (ref 26–34)
MCHC RBC AUTO-ENTMCNC: 31.4 G/DL (ref 32–36.5)
MCV RBC AUTO: 100.9 FL (ref 78–100)
NRBC BLD MANUAL-RTO: 0 /100 WBC
PLATELET # BLD: 234 K/MCL (ref 140–450)
POTASSIUM SERPL-SCNC: 3.4 MMOL/L (ref 3.4–5.1)
PROT SERPL-MCNC: 6.7 G/DL (ref 6.4–8.2)
RBC # BLD: 3.28 MIL/MCL (ref 4.5–5.9)
SODIUM SERPL-SCNC: 146 MMOL/L (ref 135–145)
WBC # BLD: 7.6 K/MCL (ref 4.2–11)

## 2020-08-17 PROCEDURE — 10002805 HB CONTRAST AGENT: Performed by: SURGERY

## 2020-08-17 PROCEDURE — 85027 COMPLETE CBC AUTOMATED: CPT

## 2020-08-17 PROCEDURE — 97163 PT EVAL HIGH COMPLEX 45 MIN: CPT | Performed by: PHYSICAL THERAPIST

## 2020-08-17 PROCEDURE — 10006031 HB ROOM CHARGE TELEMETRY

## 2020-08-17 PROCEDURE — BF131ZZ FLUOROSCOPY OF GALLBLADDER AND BILE DUCTS USING LOW OSMOLAR CONTRAST: ICD-10-PCS | Performed by: RADIOLOGY

## 2020-08-17 PROCEDURE — 10002803 HB RX 637: Performed by: HOSPITALIST

## 2020-08-17 PROCEDURE — 47531 INJECTION FOR CHOLANGIOGRAM: CPT

## 2020-08-17 PROCEDURE — 80053 COMPREHEN METABOLIC PANEL: CPT

## 2020-08-17 PROCEDURE — 10002800 HB RX 250 W HCPCS: Performed by: HOSPITALIST

## 2020-08-17 PROCEDURE — 36415 COLL VENOUS BLD VENIPUNCTURE: CPT

## 2020-08-17 PROCEDURE — 92526 ORAL FUNCTION THERAPY: CPT

## 2020-08-17 PROCEDURE — C9113 INJ PANTOPRAZOLE SODIUM, VIA: HCPCS | Performed by: HOSPITALIST

## 2020-08-17 PROCEDURE — 10002807 HB RX 258: Performed by: HOSPITALIST

## 2020-08-17 RX ORDER — BALSAM PERU/CASTOR OIL
OINTMENT (GRAM) TOPICAL 2 TIMES DAILY
Status: DISCONTINUED | OUTPATIENT
Start: 2020-08-17 | End: 2020-08-18 | Stop reason: HOSPADM

## 2020-08-17 RX ADMIN — PIPERACILLIN AND TAZOBACTAM 3.38 G: 3; .375 INJECTION, POWDER, FOR SOLUTION INTRAVENOUS at 06:07

## 2020-08-17 RX ADMIN — IOHEXOL 7 ML: 350 INJECTION, SOLUTION INTRAVENOUS at 18:29

## 2020-08-17 RX ADMIN — ENOXAPARIN SODIUM 40 MG: 40 INJECTION SUBCUTANEOUS at 08:37

## 2020-08-17 RX ADMIN — PIPERACILLIN AND TAZOBACTAM 3.38 G: 3; .375 INJECTION, POWDER, FOR SOLUTION INTRAVENOUS at 13:16

## 2020-08-17 RX ADMIN — PIPERACILLIN AND TAZOBACTAM 3.38 G: 3; .375 INJECTION, POWDER, FOR SOLUTION INTRAVENOUS at 22:27

## 2020-08-17 RX ADMIN — MEMANTINE HYDROCHLORIDE 10 MG: 10 TABLET, FILM COATED ORAL at 22:23

## 2020-08-17 RX ADMIN — DONEPEZIL HYDROCHLORIDE 10 MG: 10 TABLET ORAL at 22:23

## 2020-08-17 RX ADMIN — PANTOPRAZOLE SODIUM 40 MG: 40 INJECTION, POWDER, FOR SOLUTION INTRAVENOUS at 08:37

## 2020-08-17 RX ADMIN — IOHEXOL 7 ML: 350 INJECTION, SOLUTION INTRAVENOUS at 15:55

## 2020-08-17 RX ADMIN — MELATONIN 3 MG: at 22:23

## 2020-08-17 RX ADMIN — Medication: at 22:28

## 2020-08-17 ASSESSMENT — PAIN SCALES - PAIN ASSESSMENT IN ADVANCED DEMENTIA (PAINAD)
BODYLANGUAGE: RELAXED
BREATHING: NORMAL
CONSOLABILITY: NO NEED TO CONSOLE
NEGVOCALIZATION: OCCASIONAL MOAN OR GROAN, LOW LEVELS OF SPEECH WITH A NEGATIVE OR DISAPPROVING QUALITY
TOTALSCORE: 1
TOTALSCORE: 1
BREATHING: NORMAL
FACIALEXPRESSION: SMILING OR INEXPRESSIVE
BODYLANGUAGE: RELAXED
NEGVOCALIZATION: OCCASIONAL MOAN OR GROAN, LOW LEVELS OF SPEECH WITH A NEGATIVE OR DISAPPROVING QUALITY
CONSOLABILITY: NO NEED TO CONSOLE
FACIALEXPRESSION: SMILING OR INEXPRESSIVE

## 2020-08-17 ASSESSMENT — COGNITIVE AND FUNCTIONAL STATUS - GENERAL
BASIC_MOBILITY_RAW_SCORE: 6
BASIC_MOBILITY_CONVERTED_SCORE: 16.59

## 2020-08-17 ASSESSMENT — PAIN SCALES - WONG BAKER
WONGBAKER_NUMERICALRESPONSE: 0
WONGBAKER_NUMERICALRESPONSE: 0

## 2020-08-17 ASSESSMENT — UNIFIED PARKINSONS DISEASE RATING SCALE (UPDRS)
RIGIDITY_SCALE: SLIGHT OR MILD
TREMOR_ATREST_SCALE: SLIGHT OR INFREQUENT

## 2020-08-18 VITALS
BODY MASS INDEX: 23.03 KG/M2 | HEIGHT: 66 IN | DIASTOLIC BLOOD PRESSURE: 94 MMHG | RESPIRATION RATE: 18 BRPM | SYSTOLIC BLOOD PRESSURE: 169 MMHG | WEIGHT: 143.3 LBS | TEMPERATURE: 98.1 F | HEART RATE: 64 BPM | OXYGEN SATURATION: 90 %

## 2020-08-18 LAB
ANION GAP SERPL CALC-SCNC: 9 MMOL/L (ref 10–20)
BUN SERPL-MCNC: 36 MG/DL (ref 6–20)
BUN/CREAT SERPL: 45 (ref 7–25)
CALCIUM SERPL-MCNC: 8.6 MG/DL (ref 8.4–10.2)
CHLORIDE SERPL-SCNC: 114 MMOL/L (ref 98–107)
CO2 SERPL-SCNC: 27 MMOL/L (ref 21–32)
CREAT SERPL-MCNC: 0.8 MG/DL (ref 0.67–1.17)
ERYTHROCYTE [DISTWIDTH] IN BLOOD: 16.9 % (ref 11–15)
GLUCOSE SERPL-MCNC: 131 MG/DL (ref 65–99)
HCT VFR BLD CALC: 33.9 % (ref 39–51)
HGB BLD-MCNC: 10.7 G/DL (ref 13–17)
MCH RBC QN AUTO: 31.3 PG (ref 26–34)
MCHC RBC AUTO-ENTMCNC: 31.6 G/DL (ref 32–36.5)
MCV RBC AUTO: 99.1 FL (ref 78–100)
NRBC BLD MANUAL-RTO: 0 /100 WBC
PLATELET # BLD: 248 K/MCL (ref 140–450)
POTASSIUM SERPL-SCNC: 3.1 MMOL/L (ref 3.4–5.1)
RBC # BLD: 3.42 MIL/MCL (ref 4.5–5.9)
SODIUM SERPL-SCNC: 147 MMOL/L (ref 135–145)
WBC # BLD: 7 K/MCL (ref 4.2–11)

## 2020-08-18 PROCEDURE — 10002803 HB RX 637: Performed by: HOSPITALIST

## 2020-08-18 PROCEDURE — 80048 BASIC METABOLIC PNL TOTAL CA: CPT

## 2020-08-18 PROCEDURE — 10002800 HB RX 250 W HCPCS: Performed by: HOSPITALIST

## 2020-08-18 PROCEDURE — 10004651 HB RX, NO CHARGE ITEM: Performed by: HOSPITALIST

## 2020-08-18 PROCEDURE — 10002807 HB RX 258: Performed by: HOSPITALIST

## 2020-08-18 PROCEDURE — C9113 INJ PANTOPRAZOLE SODIUM, VIA: HCPCS | Performed by: HOSPITALIST

## 2020-08-18 PROCEDURE — 85027 COMPLETE CBC AUTOMATED: CPT

## 2020-08-18 PROCEDURE — 36415 COLL VENOUS BLD VENIPUNCTURE: CPT

## 2020-08-18 RX ADMIN — LEVOTHYROXINE SODIUM 75 MCG: 75 TABLET ORAL at 05:53

## 2020-08-18 RX ADMIN — TAMSULOSIN HYDROCHLORIDE 0.4 MG: 0.4 CAPSULE ORAL at 08:53

## 2020-08-18 RX ADMIN — ASPIRIN 325 MG ORAL TABLET 325 MG: 325 PILL ORAL at 08:53

## 2020-08-18 RX ADMIN — CITALOPRAM HYDROBROMIDE 10 MG: 20 TABLET ORAL at 08:53

## 2020-08-18 RX ADMIN — POTASSIUM CHLORIDE 40 MEQ: 1500 TABLET, EXTENDED RELEASE ORAL at 11:31

## 2020-08-18 RX ADMIN — ENOXAPARIN SODIUM 40 MG: 40 INJECTION SUBCUTANEOUS at 08:53

## 2020-08-18 RX ADMIN — MEMANTINE HYDROCHLORIDE 10 MG: 10 TABLET, FILM COATED ORAL at 08:53

## 2020-08-18 RX ADMIN — PANTOPRAZOLE SODIUM 40 MG: 40 INJECTION, POWDER, FOR SOLUTION INTRAVENOUS at 08:54

## 2020-08-18 RX ADMIN — PIPERACILLIN AND TAZOBACTAM 3.38 G: 3; .375 INJECTION, POWDER, FOR SOLUTION INTRAVENOUS at 05:41

## 2020-08-18 RX ADMIN — Medication: at 08:54

## 2020-08-20 LAB
BACTERIA BLD CULT: NORMAL
BACTERIA BLD CULT: NORMAL
REPORT STATUS (RPT): NORMAL
REPORT STATUS (RPT): NORMAL
SPECIMEN SOURCE: NORMAL
SPECIMEN SOURCE: NORMAL

## (undated) NOTE — IP AVS SNAPSHOT
[facility]            (For Outpatient Use Only) Initial Admit Date: 4/15/2019   Inpt/Obs Admit Date: Inpt: 4/15/19 / Obs: N/A   Discharge Date:    Frieda Proper:  [de-identified]   MRN: [de-identified]   CSN: [id number]   CEID: [id number]        [employer] Group Number:  Insurance Type:    Subscriber Name:  Subscriber :    Subscriber ID:  Pt Rel to Subscriber:    Hospital Account Financial Class: Medicare    2019

## (undated) NOTE — ED AVS SNAPSHOT
Carmella Gallardo   MRN: [id number]    Department:  [facility] Emergency Department   Date of Visit:  11/25/2018           Disclosure     Insurance plans vary and the physician(s) referred by the ER may not be covered by your plan.  Please con within the next three months to obtain basic health screening including reassessment of your blood pressure.     IF THERE IS ANY CHANGE OR WORSENING OF YOUR CONDITION, CALL YOUR PRIMARY CARE PHYSICIAN AT ONCE OR RETURN IMMEDIATELY TO THE EMERGENCY DEPARTMEN

## (undated) NOTE — ED AVS SNAPSHOT
Giovanna Garg   MRN: [id number]    Department:  [facility] Emergency Department   Date of Visit:  1/17/2019           Disclosure     Insurance plans vary and the physician(s) referred by the ER may not be covered by your plan.  Please cont within the next three months to obtain basic health screening including reassessment of your blood pressure.     IF THERE IS ANY CHANGE OR WORSENING OF YOUR CONDITION, CALL YOUR PRIMARY CARE PHYSICIAN AT ONCE OR RETURN IMMEDIATELY TO THE EMERGENCY DEPARTMEN

## (undated) NOTE — IP AVS SNAPSHOT
Patient Demographics     Address  [address] [place] [address] Phone  [phone] [facility]  [phone] [facility]      Emergency Contact(s)     Name Relation Home Work Mobile    Mike Cartwright Son [phone]    Mikel Cartwright Suzie Houston MD         Citicoline Sodium 500 MG Tabs  Next dose due:  4/21/19 Bedtime      2 CAPSULES TWICE DAILY. [facility] OR  Next dose due:  4/21/19 Bedtime      Take by mouth 2 (two) times daily.  20 drops a day          Donepezil HC Please  your prescriptions at the location directed by your doctor or nurse    Bring a paper prescription for each of these medications  ipratropium-albuterol 0.5-2.5 (3) MG/3ML Soln           527-527-A - MAR ACTION REPORT  (last 24 hrs)    ** SITE Most Recent Value   Patient Weight  65.3 kg (144 lb)         Lab Results Last 24 Hours      BASIC METABOLIC PANEL (8) [[id number]] (Abnormal)  Resulted: 04/21/19 0846, Result status: Final result   Ordering provider:  Ekaterina Lezama MD  04/21/19 6977 Resulti 0830    Specimen:  Stool      C.  Difficile Toxin B Gene Negative    Respiratory Panel FLU expanded Once [[id number]]  (Abnormal) Collected:  04/15/19 1945    Order Status:  Completed Lab Status:  Final result Updated:  04/15/19 2114    Specimen:  Other from -will stop abx with no leukocytosis, negative PCT   -pulmonary eval     Hyponatremia  -Admission Na 134-->143  -S/P IVF in ER  -follow    Dsyphagia   -pureed diet at home  -Speech eval-pureed with nectar  -video in am    Anemia  -baseline hgb 11.8- 13.9  - warm, temps via forehead thermometer was 99. He was coughing as well. His cognitive state was off, he normally confused but very talkative which he was not.  He also can feed himself but on Sunday night and Monday morning he needed someone to feed him by Mo Past Surgical History:   Procedure Laterality Date   • CATARACT      x 2   • COLONOSCOPY,DIAGNOSTIC  1/8/08    diverticulosis   • COLONOSCOPY,DIAGNOSTIC  12/9/05    wnl   • COLONOSCOPY,DIAGNOSTIC  11/19/03   • COLONOSCOPY,DIAGNOSTIC  11/15/02   • [place] by mouth once daily. Disp: 90 capsule Rfl: 3   Melatonin 3 MG Oral Cap Take by mouth nightly. Disp:  Rfl:    Vitamin E ([facility] OR) Take by mouth 2 (two) times daily.  20 drops a day Disp:  Rfl:    Cholecalciferol (VITAMIN D) 2000 UNITS Oral Tab Take 1 ta Patient examined and assessed independently. Agree with above APN assessment.      Mr. Caryle Dess is an[GV.1] 81 yo male hx afib, HTN, CAD, S/P PPM, MD, colon cancer S/P resection and chemo, recurrent prostate cancer S/P brachytherapy, advanced dementia, initial standing but can walk with walker on own  -continue namenda  -follows with Dr Padmini Cardenas    Depression  -continue lexapro    Insomnia  -prn melatonin    Constipation  -hx of numerous bowel obstruction  -last BM Sunday  -miralex, senokot and florastor  - cognitive state was off, he normally confused but very talkative which he was not. He also can feed himself but on Sunday night and Monday morning he needed someone to feed him by Monday afternoon he would only take applesauce.  He needs help to stand up bu • COLONOSCOPY,DIAGNOSTIC  1/8/08    diverticulosis   • COLONOSCOPY,DIAGNOSTIC  12/9/05    wnl   • COLONOSCOPY,DIAGNOSTIC  11/19/03   • COLONOSCOPY,DIAGNOSTIC  11/15/02   • COLONOSCOPY,DIAGNOSTIC  3/7/12    wnl   • OTHER SURGICAL HISTORY  1999    removed po Melatonin 3 MG Oral Cap Take by mouth nightly. Disp:  Rfl:    Vitamin E ([facility] OR) Take by mouth 2 (two) times daily. 20 drops a day Disp:  Rfl:    Cholecalciferol (VITAMIN D) 2000 UNITS Oral Tab Take 1 tablet by mouth daily.  Disp:  Rfl:    Citicoline Electronically signed by Jostin Rocha NP on 4/16/2019  3:57 PM   Attribution Key    GB. 1 - Georgi Montoya NP on 4/16/2019 11:00 AM  GB. 2 - Jostin Rocha NP on 4/16/2019 11:01 AM               H&P signed by Jostin Rocha NP at 4/16/2019 11:09 AM -continue lexapro    Insomnia  -prn melatonin    Constipation  -hx of numerous bowel obstruction  -last BM Sunday  -miralex, senokot and florastor  -follow    GOC  -wife is POA  -DNR per daughter at [facility]  -has 24/7 care with caretaker    ANTONINA burns in am  - (Axillary)   Resp 22   Ht 5' 6\" (1.676 m)   Wt 144 lb (65.3 kg)   SpO2 (!) 87%   BMI 23.24 kg/m²[GB. 2]     GENERAL: no apparent distress  NEUROLOGIC: Awake, orientated to self- tells me his name in [address]: no rashes  HEENT: normocephalic, normal nose Outpatient Medications Marked as Taking for the 4/15/19 encounter [facility] Encounter):  TAMSULOSIN HCL 0.4 MG Oral Cap TAKE ONE CAPSULE BY MOUTH ONE TIME DAILY  Disp: 90 capsule Rfl: 3   CITALOPRAM HYDROBROMIDE 10 MG Oral Tab Take one tablet (10 mg total) Problem Relation Age of Onset   • Other (Other) Mother         stroke[GB. 2]       Review of Systems  A comprehensive 10 point review of systems was completed. Pertinent positives and negatives noted in the the HPI. DIAGNOSTIC DATA:   CBC/Chem[GB. 1] Date of Admission: 4/15/2019  6:52 PM  Admission Diagnosis: Pneumonia due to human metapneumovirus [J12.3]  Community acquired pneumonia, unspecified laterality [J18.9]    Assessment/Plan:  1.  Acute hypoxic respiratory failure - from viral etiology, mild v • COLONOSCOPY,DIAGNOSTIC  12/9/05    wnl   • COLONOSCOPY,DIAGNOSTIC  11/19/03   • COLONOSCOPY,DIAGNOSTIC  11/15/02   • COLONOSCOPY,DIAGNOSTIC  3/7/12    wnl   • OTHER SURGICAL HISTORY  1999    removed portion of colon   • OTHER SURGICAL HISTORY      pacema HEENT: Atraumatic, Lips, mucosa, and tongue[VB. 1] dry[VB. 2]. No thrush noted. Lungs:[VB.1] diminished[VB. 2]   Chest wall: No tenderness or deformity. Heart: Regular rate and rhythm, normal S1S2, no murmur.    Abdomen: soft, non-tender, non-distended, Consults:   Consultants     Provider Role Specialty    Marilu Godoy MD Consulting Physician  PULMONARY DISEASES    Lizeth Proctor MD Consulting Physician  HOSPITALIST          Discharge Diagnoses:   Viral PNA  Hypoxic Respiratory Failure  Diarrhea  Reas -on admissionTmax 102 WBC 7.7 LA negative.  PCT negative-->repeat slightly elevated   -CXR with b/L perihilar and lower lobe pul congestive changes, R>L worse since 1/17/19  -blood cx NGTD  -BNP 2171, IV lasix 20 mg x 1  -continue prn nebs follow up with pu Commonly known as:  DUONEB  Take 3 mL by nebulization every 6 (six) hours as needed. CONTINUE taking these medications    aspirin 325 MG Tabs  Take 1 tablet (325 mg total) by mouth daily.      Azelastine HCl 0.1 % Soln  Commonly known as:  ASTELIN  1 Specialty:  Internal Medicine  Contact information:  [address] [place] [address] [phone]             [address] Why:  PER.  NURSE (GALEN) PATIENT NOT APPROPRIATE FOR PNA F/U W/SPECIALTY CLI Active Problems:    Community acquired pneumonia    Community acquired pneumonia, unspecified laterality      OCCUPATIONAL THERAPY ASSESSMENT     RN Mark Penn cleared pt for OT session. Pt received up in chair, indicating no pain at this time.  Caregiver bedsid OT Treatment Plan: ADL training;Balance activities; Energy conservation/work simplification techniques; Functional transfer training;UE strengthening/ROM; Endurance training;Patient/Family education;Patient/Family training;Equipment eval/education; Compensator Education Provided: Role of OT, unsupported sitting & trunk strengthening, sit to stands, sitting and standing balance, BUE therapeutic exercises (A/AAROM), safety awareness; caregiver provided with BUE therapeutic exercise education and current functional control and formation of both consistencies. No oral retention noted. Pharyngeal response appeared grossly timely per hyolaryngeal elevation to completion (functional rise/strength per palpation).  No overt clinical signs of aspiration on any swallows of pu GOAL MET     Goal #2 The patient/family/caregiver will demonstrate understanding and implementation of aspiration precautions and swallow strategies independently over 3 session(s).     Reviewed swallowing precautions/strategies with Pt and Pt's private ca Regadenoson . 1mg per unit IV 06/14/11     Regadenoson . 1mg per unit IV 12/13/10     TDAP 12/19/11     Technetium Tc99mm Sestamibi, Iv, Up To 40 Millicuries 27/98/27     Technetium Tc99mm Sestamibi, Iv, Up To 40 Millicuries 84/33/37     Technetium Tc99mm S

## (undated) NOTE — IP AVS SNAPSHOT
[facility]            (For Outpatient Use Only) Initial Admit Date: 11/11/2018   Inpt/Obs Admit Date: Inpt: 11/11/18 / Obs: N/A   Discharge Date:    Regis Blackman:  [de-identified]   MRN: [de-identified]   CSN: [id number]        ENCOUNTER  Patient Cla Subscriber Name:  Arnoldo Villalobos :    Subscriber ID:  Pt Rel to Subscriber:    Hospital Account Financial Class: Medicare    2018

## (undated) NOTE — IP AVS SNAPSHOT
Patient Demographics     Address  [address] [address] Phone  [phone] [facility]  [phone] [facility]      Emergency Contact(s)     Name Relation Home Work Mobile    Mike Cartwright Son [phone]    Mikel Cartwright TAKE ONE TABLET BY MOUTH NIGHTLY   Justice Luna MD         Glycopyrrolate 25 MCG/ML Soln  Next dose due:  Start tonight 11/14 PM      Inhale 1 vial into the lungs Q12H.    DO SHARMIN Pierce AREDS FORMULA OR  Next dose due:  Start tomorrow 1 ** SITE UNKNOWN **     Order ID Medication Name Action Time Action Reason Comments    [id number] Alfuzosin HCl ER (UROXATRAL) 24 hr tab 10 mg 11/14/18 0911 Given      [id number] Donepezil HCl (ARICEPT) tab 10 mg 11/13/18 2007 Given      [id number] Levothyrox Calculated Osmolality 284 275 - 295 mOsm/kg — [facility]   GFR, Non-African American >60 >=60 — [facility]   GFR, African-American >60 >=60 [employer]   Comment:           Estimated GFR units: mL/min/1.73 square meters   eGFR calculated by the CKD- Type Source Collected On   Blood — 11/14/18 0546          Components    Component Value Reference Range [facility]   PT 25.2 11.8 - 14.5 seconds [facility]   Comment:           Elevations of the PT and/or INR in patients not  receiving anticoagulant ther -d/c with senna s plus miralax[HP.2]    #Advanced Dementia:  -[HP. 1]Full code per wife, needs further discussion[HP.2]  -home memantine, donepezil[HP.1], citalopram[HP.3]   -social work consulted, may benefit from home palliative, increased services  -todd • Personal history of colon cancer 1999    [place] C, resection, CTx   • Personal history of prostate cancer 2005    Prostate cancer. [place] 3,3.   Pretreatment psa 7.0   • Unspecified essential hypertension         PSH  Past Surgical History:   Procedure L ABD: Soft, non-tender, non-distended,[HP.1] +BS[HP.2]  MSK:[HP.1] moving all extremities spontaneously[HP. 2]  Neuro: Grossly normal, CN intact, sensory intact  Psych: Affect- normal  SKIN: warm, dry  EXT: no edema    Diagnostic Data:    CBC/Chem    Recent rectum. Findings may represent a combination of constipation/delayed enteric transit and small bowel ileus versus early or partial small bowel obstruction. Mild free fluid in the pelvis.   Multiple bilateral renal cystic lesions, including a dominant left History of Present Illness:  Sophia White   is a 80year old    Male who has dementia and his son is at the bedside. The patient  presents with a history of colon cancer status post open resection in 1998.   Patient has had recurrent small bowel obs Problem Relation Age of Onset   • Other (Other) Mother         stroke      reports that  has never smoked. he has never used smokeless tobacco. He reports that he drinks alcohol. He reports that he does not use drugs.     Allergies:    Detrol [Tolterodine] organomegaly and No palpable masses present. Well-healed midline incision present. No ventral hernia present.   Extremities: extremities normal, atraumatic, no cyanosis or edema  Skin: Skin color, texture, turgor normal. No rashes or lesions  Psychiatric: obstruction. Mild free fluid in the pelvis. Multiple bilateral renal cystic lesions, including a dominant left upper pole cyst measuring up to 8.7 cm.  There is a complex, partially calcified left renal mass, not significantly changed since prior study fr partial versus complete small bowel obstruction. Will hold off on NG tube at this time. Given the patient's advanced age and dementia we will try to treat medically and avoid surgery if at all possible. Will follow along closely with you.   Patient fully to participate in therapy on this date. Coordinated session with OT. Therapist educated patient and family on POC. Education on physiological benefits of mobilization.  Patient's spouse and dtr educated on therapy session goal to provide recommendation of safest DC disposition at this time would be sub-acute rehab.  Through discussion with family on benefits of rehab to optimize safety while family prepares home for safe return and has time organize caregiver services, pt's spouse and dtr appear more at ease -   Moving to and from a bed to a chair (including a wheelchair)?: A Lot   -   Need to walk in hospital room?: A Lot   -   Climbing 3-5 steps with a railing?: Total     AM-PAC Score:  Raw Score: 12   PT Approx Degree of Impairment Score: 68.66%   [facility] Physical Therapy Note signed by Rolando Beltran PT at 11/13/2018  5:14 PM  Version 1 of 1    Author:   Anjum Li Service:  Rehab Author Type:  Physical Therapist    Filed:  11/13/2018  5:14 PM Date of Service:  11/13/2018  4:48 PM Status:  Sign impaired walker management, verbal cues and assist provided to steer walking during turns. Pt performed stand to sit transfer onto toilet with bilateral grab bars for safety, tactile cues for hand placement, total assist for doffing depends.  Pt performed 2 DISCHARGE RECOMMENDATIONS  PT Discharge Recommendations: 24 hour care/supervision;Home with home health PT    PLAN  PT Treatment Plan: Bed mobility; Body mechanics; Endurance; Energy conservation;Patient education; Family education;Gait training;Stair training Patient Owned Equipment: Rolling walker  Patient Regularly Uses: None    Prior Level of [place]: pt lives with his wife in a split-level house with bedroom on the 2nd floor.  Wife reports he was performing basic ADLs like transfers, ambulation, and sta How much difficulty does the patient currently have. ..  -   Turning over in bed (including adjusting bedclothes, sheets and blankets)?: A Little   -   Sitting down on and standing up from a chair with arms (e.g., wheelchair, bedside commode, etc.): A Lot Current Status    Goal #2 Patient is able to demonstrate transfers Sit to/from Stand at assistance level: moderate assistance with walker - rolling with caregiver assist     Goal #2  Current Status    Goal #3 Patient is able to ambulate 20 feet with assist Room Number: 457/457-A[KH.1]           Presenting Problem: sbo[KH. 2]    Problem List[KH. 1]  Principal Problem:    Small bowel obstruction (HCC)  Active Problems:    Azotemia    Hyperglycemia[KH. 2]      ASSESSMENT   RN cleared pt for participation in [address] [place] [address] as pt requires a two person assist to safely complete mobility[KH.1] and transfers[KH.3]. Family agreeable --stating it would give time to for pt to return to [facility] and for the family to set-up necessary supports at home.  Family is interested in a CHUYITA with Lower Extremity Dressing:[KH.1] total A donning briefs[KH.3]     Education Provided:[KH.1] role of OT, activity promotion[KH.3]   Patient End of Session: Up in chair;Needs met;Call light within reach;RN aware of session/findings; Alarm set; Family present[KH patient is below baseline and would benefit from skilled inpatient OT to address the above deficits, maximizing patient's ability to return to prior level of function.  Recommend return to home w/ 24 hour supervision and physical assist for adls and transfe OCCUPATIONAL THERAPY MEDICAL/SOCIAL HISTORY     Problem List  Principal Problem:    Small bowel obstruction (HCC)  Active Problems:    Azotemia    Hyperglycemia      Past Medical History  Past Medical History:   Diagnosis Date   • [facility] supervision and no ad. Pt received assist for bathing and dressing.      SUBJECTIVE  \"I've become an old man\"    OCCUPATIONAL THERAPY EXAMINATION      OBJECTIVE  Precautions: Bed/chair alarm(dementia)  Fall Risk: High fall risk    PAIN ASSESSMENT  Rating: Pt will complete functional transfer with mod assist of family    Comment:         Pt will maintain static  standing for 2 minutes w/ rw and cga for clothing management  Comment:    Pt's wife and daughter will demonstrate understanding of transfer techniq Treatment Plan/Recommendations: Aspiration precautions[KK. 2]    HISTORY   Background/Objective Information:    Problem Michael Ramirez. 1]  Principal Problem:    Small bowel obstruction (HCC)  Active Problems:    Azotemia    Hyperglycemia[KK. 2]    Past Medical Hist consistencies;Small bites and sips;Multiple swallows  Effectiveness: Yes        PUREE  Oral Phase of Swallow (VFSS - Puree):  Within Functional Limits  Triggered at: Valleculae  Premature Spillage to: Valleculae  Delay (seconds): (2)  Residue Severity, Loca Goal #3 The patient will utilize compensatory strategies as outlined by  VFSS including Slow rate, Small bites, Small sips, Multiple swallows, Alternate liquids/solids, Upright 90 degrees, Eliminate distractions, Supervision with meals with mild assistance Technetium Tc99mm Sestamibi, Iv, Up To 40 Millicuries 88/87/82     Technetium Tc99mm Sestamibi, Iv, Up To 40 Millicuries 32/13/76       Future Appointments        Provider Idalmis Arriaza    11/20/2018 10:30 AM [place] [id number] [facility]